# Patient Record
Sex: FEMALE | Race: OTHER | Employment: OTHER | URBAN - METROPOLITAN AREA
[De-identification: names, ages, dates, MRNs, and addresses within clinical notes are randomized per-mention and may not be internally consistent; named-entity substitution may affect disease eponyms.]

---

## 2017-04-25 ENCOUNTER — HOSPITAL ENCOUNTER (OUTPATIENT)
Dept: MAMMOGRAPHY | Age: 72
Discharge: HOME | End: 2017-04-25
Attending: INTERNAL MEDICINE
Payer: MEDICARE

## 2017-04-25 DIAGNOSIS — M81.0 SENILE OSTEOPOROSIS: ICD-10-CM

## 2017-04-25 PROCEDURE — 77080 DXA BONE DENSITY AXIAL: CPT

## 2018-01-22 ENCOUNTER — OFFICE VISIT (OUTPATIENT)
Dept: NEUROLOGY | Age: 73
End: 2018-01-22

## 2018-01-22 VITALS
OXYGEN SATURATION: 98 % | SYSTOLIC BLOOD PRESSURE: 104 MMHG | HEART RATE: 63 BPM | BODY MASS INDEX: 25.51 KG/M2 | WEIGHT: 135 LBS | RESPIRATION RATE: 18 BRPM | DIASTOLIC BLOOD PRESSURE: 62 MMHG

## 2018-01-22 DIAGNOSIS — R20.9 SKIN SENSATION DISTURBANCE: ICD-10-CM

## 2018-01-22 DIAGNOSIS — R20.2 PARESTHESIA: Primary | ICD-10-CM

## 2018-01-22 RX ORDER — ALENDRONATE SODIUM 70 MG/1
TABLET ORAL
COMMUNITY
End: 2018-03-29

## 2018-01-22 NOTE — MR AVS SNAPSHOT
[facility] [address] [facility] 13 
[phone] Patient: Birgit Almonte MRN: [id number] :1945 Visit Information Date & Time Provider Department Dept. Phone Encounter #  
 2018  3:00 PM Kamila Nelson, [address] [facility] at [address] [id number] Follow-up Instructions Return if symptoms worsen or fail to improve. Upcoming Health Maintenance Date Due Hepatitis C Screening 1945 DTaP/Tdap/Td series (1 - Tdap) 1967 BREAST CANCER SCRN MAMMOGRAM 1996 FOBT Q 1 YEAR AGE 50-75 1996 ZOSTER VACCINE AGE 60> 10/28/2006 GLAUCOMA SCREENING Q2Y 2011 Pneumococcal 65+ Low/Medium Risk (1 of 2 - PCV13) 2011 MEDICARE YEARLY EXAM 2011 Influenza Age 5 to Adult 2017 Allergies as of 2018  Review Complete On: 2018 By: Kamila Nelson DO Severity Noted Reaction Type Reactions Codeine  2016    Other (comments)  
 dizziness Current Immunizations  Never Reviewed No immunizations on file. Not reviewed this visit You Were Diagnosed With   
  
 Codes Comments Paresthesia    -  Primary ICD-10-CM: R20.2 ICD-9-CM: 782.0 Skin sensation disturbance     ICD-10-CM: R20.9 ICD-9-CM: 917. 0 Vitals Resp Weight(growth percentile) SpO2 BMI Smoking Status 18 135 lb (61.2 kg) 98% 25.51 kg/m2 Never Smoker BMI and BSA Data Body Mass Index Body Surface Area 25.51 kg/m 2 1.62 m 2 Preferred Pharmacy Pharmacy Name Phone [id number] [place], [address] [phone] Your Updated Medication List  
  
   
This list is accurate as of: 18  3:42 PM.  Always use your most recent med list.  
  
  
  
  
 cholecalciferol 1,000 unit Cap Commonly known as:  VITAMIN D3  
 Take  by mouth daily. cyanocobalamin 1,000 mcg tablet Take 1,500 mcg by mouth daily. FOSAMAX 70 mg tablet Generic drug:  alendronate Take  by mouth.  
  
 pitavastatin calcium 1 mg Tab tablet Commonly known as:  LIVALO Take  by mouth nightly. Follow-up Instructions Return if symptoms worsen or fail to improve. To-Do List   
 01/22/2018 Imaging:  MRI [facility] SPINE WO CONT Patient Instructions A Healthy Lifestyle: Care Instructions Your Care Instructions A healthy lifestyle can help you feel good, stay at a healthy weight, and have plenty of energy for both work and play. A healthy lifestyle is something you can share with your whole family. A healthy lifestyle also can lower your risk for serious health problems, such as high blood pressure, heart disease, and diabetes. You can follow a few steps listed below to improve your health and the health of your family. Follow-up care is a key part of your treatment and safety. Be sure to make and go to all appointments, and call your doctor if you are having problems. It's also a good idea to know your test results and keep a list of the medicines you take. How can you care for yourself at home? · Do not eat too much sugar, fat, or fast foods. You can still have dessert and treats now and then. The goal is moderation. · Start small to improve your eating habits. Pay attention to portion sizes, drink less juice and soda pop, and eat more fruits and vegetables. ¨ Eat a healthy amount of food. A 3-ounce serving of meat, for example, is about the size of a deck of cards. Fill the rest of your plate with vegetables and whole grains. ¨ Limit the amount of soda and sports drinks you have every day. Drink more water when you are thirsty. ¨ Eat at least 5 servings of fruits and vegetables every day.  It may seem like a lot, but it is not hard to reach this goal. A serving or helping is 1 piece of fruit, 1 cup of vegetables, or 2 cups of leafy, raw vegetables. Have an apple or some carrot sticks as an afternoon snack instead of a candy bar. Try to have fruits and/or vegetables at every meal. 
· Make exercise part of your daily routine. You may want to start with simple activities, such as walking, bicycling, or slow swimming. Try to be active 30 to 60 minutes every day. You do not need to do all 30 to 60 minutes all at once. For example, you can exercise 3 times a day for 10 or 20 minutes. Moderate exercise is safe for most people, but it is always a good idea to talk to your doctor before starting an exercise program. 
· Keep moving. Salo Broom the lawn, work in the garden, or [employer]. Take the stairs instead of the elevator at work. · If you smoke, quit. People who smoke have an increased risk for heart attack, stroke, cancer, and other lung illnesses. Quitting is hard, but there are ways to boost your chance of quitting tobacco for good. ¨ Use nicotine gum, patches, or lozenges. ¨ Ask your doctor about stop-smoking programs and medicines. ¨ Keep trying. In addition to reducing your risk of diseases in the future, you will notice some benefits soon after you stop using tobacco. If you have shortness of breath or asthma symptoms, they will likely get better within a few weeks after you quit. · Limit how much alcohol you drink. Moderate amounts of alcohol (up to 2 drinks a day for men, 1 drink a day for women) are okay. But drinking too much can lead to liver problems, high blood pressure, and other health problems. Family health If you have a family, there are many things you can do together to improve your health. · Eat meals together as a family as often as possible. · Eat healthy foods. This includes fruits, vegetables, lean meats and dairy, and whole grains. · Include your family in your fitness plan.  Most people think of activities such as jogging or tennis as the way to fitness, but there are many ways you and your family can be more active. Anything that makes you breathe hard and gets your heart pumping is exercise. Here are some tips: 
¨ Walk to do errands or to take your child to school or the bus. ¨ Go for a family bike ride after dinner instead of watching TV. Where can you learn more? Go to http://rachael-tiny.info/. Enter N005 in the search box to learn more about \"A Healthy Lifestyle: Care Instructions. \" Current as of: May 12, 2017 Content Version: 11.4 © 7805-3251 Comply365. Care instructions adapted under license by [employer] (which disclaims liability or warranty for this information). If you have questions about a medical condition or this instruction, always ask your healthcare professional. [address] any warranty or liability for your use of this information. Introducing [facility] & HEALTH SERVICES! Moon Quintanilla introduces [employer] patient portal. Now you can access parts of your medical record, email your doctor's office, and request medication refills online. 1. In your internet browser, go to [email or web address] 2. Click on the First Time User? Click Here link in the Sign In box. You will see the New Member Sign Up page. 3. Enter your [employer] Access Code exactly as it appears below. You will not need to use this code after youve completed the sign-up process. If you do not sign up before the expiration date, you must request a new code. · [employer] Access Code: [email or web address] Expires: 4/22/2018  2:31 PM 
 
4. Enter the last four digits of your Social Security Number (xxxx) and Date of Birth (mm/dd/yyyy) as indicated and click Submit. You will be taken to the next sign-up page. 5. Create a [employer] ID.  This will be your [employer] login ID and cannot be changed, so think of one that is secure and easy to remember. 6. Create a rPath password. You can change your password at any time. 7. Enter your Password Reset Question and Answer. This can be used at a later time if you forget your password. 8. Enter your e-mail address. You will receive e-mail notification when new information is available in [address] 9. Click Sign Up. You can now view and download portions of your medical record. 10. Click the Download Summary menu link to download a portable copy of your medical information. If you have questions, please visit the Frequently Asked Questions section of the rPath website. Remember, rPath is NOT to be used for urgent needs. For medical emergencies, dial 911. Now available from your iPhone and Android! Please provide this summary of care documentation to your next provider. Your primary care clinician is listed as Trace Davidson. If you have any questions after today's visit, please call [phone].

## 2018-01-22 NOTE — PROGRESS NOTES
[facility]   NEW PATIENT EVALUATION/CONSULTATION       PATIENT NAME: Humera Fatima    MRN: [id number]    REASON FOR CONSULTATION: Tingling    01/22/18      Previous records (physician notes, laboratory reports, and radiology reports) and imaging studies were reviewed and summarized. My recommendations will be communicated back to the patient's physician(s) via electronic medical record and/or by [place] mail. HISTORY OF PRESENT ILLNESS:  Humera Fatima is a 71 y.o. right handed female presenting for evaluation of back paresthesias. She noted 2 months ago intermittent tingling/pinching around the T4 region, midline. No numbness or sharp radiating pain. No skin rash, vesicles. Denies back pain. Sx remain intermittent, not getting worse since onset. No LE paresthesias. No bowel/bladder incontinence. No h/o DM. Reports recent lab work was normal at her yearly check-up. No recent back trauma. Denies h/o paresthesias. No exacerbating factors. Hot compress improves sx.         PAST MEDICAL HISTORY  Past Medical History:   Diagnosis Date    Family history of cardiac disorder 2/17/2016    Hypercholesteremia 2/17/2016       PAST SURGICAL HISTORY:  Past Surgical History:   Procedure Laterality Date    HX HYSTERECTOMY  2/26/2014       FAMILY HISTORY:   Family History   Problem Relation Age of Onset    Heart Disease Mother     Heart Disease Father     Pacemaker Sister     Pacemaker Brother     Heart Disease Brother          SOCIAL HISTORY:  Social History     Social History    Marital status:      Spouse name: N/A    Number of children: N/A    Years of education: N/A     Social History Main Topics    Smoking status: Never Smoker    Smokeless tobacco: Never Used    Alcohol use No    Drug use: None    Sexual activity: Not Asked     Other Topics Concern    None     Social History Narrative         MEDICATIONS:   Current Outpatient Prescriptions   Medication Sig Dispense Refill    alendronate (FOSAMAX) 70 mg tablet Take  by mouth.  pitavastatin (LIVALO) 1 mg tab tablet Take  by mouth nightly.  cholecalciferol (VITAMIN D3) 1,000 unit cap Take  by mouth daily.  cyanocobalamin 1,000 mcg tablet Take 1,500 mcg by mouth daily. ALLERGIES:  Allergies   Allergen Reactions    Codeine Other (comments)     dizziness         REVIEW OF SYSTEMS:  10 point ROS reviewed with patient. Please see scanned document under media. PHYSICAL EXAM:  Vital Signs:   Visit Vitals    /62    Pulse 63    Resp 18    Wt 61.2 kg (135 lb)    SpO2 98%    BMI 25.51 kg/m2        General Medical Exam:  General:  Well appearing, comfortable, in no apparent distress. Eyes/ENT: see cranial nerve examination. Neck: No masses appreciated. Full range of motion without tenderness. Respiratory:  Clear to auscultation, good air entry bilaterally. Cardiac:  Regular rate and rhythm, no murmur. GI:  Soft, non-tender, non-distended abdomen. Bowel sounds normal. No masses, organomegaly. Extremities:  No deformities, edema, or skin discoloration. Skin:  No rashes or lesions. Neurological:  · Mental Status:  Alert and oriented to person, place, and time with fluent speech. · Cranial Nerves:   CNII/III/IV/VI: visual fields full to confrontation, EOMI, PERRL, no ptosis or nystagmus. CN V: Facial sensation intact bilaterally, masseter 5/5   CN VII: Facial muscles symmetric and strong   CN VIII: Hears finger rub well bilaterally, intact vestibular function   CN IX/X: Normal palatal movement   CN XI: Full strength shoulder shrug bilaterally   CN XII: Tongue protrusion full and midline without fasciculation or atrophy  · Motor: Normal tone and muscle bulk with no pronator drift.    Individual muscle group testing:  Shoulder abduction:   Left:5/5   Right : 5/5    Shoulder adduction:   Left:5/5   Right : 5/5    Elbow flexion:      Left:5/5   Right : 5/5  Elbow extension:    Left:5/5   Right : 5/5 Wrist flexion:    Left:5/5   Right : 5/5  Wrist extension:    Left:5/5   Right : 5/5  Arm pronation:   Left:5/5   Right : 5/5  Arm supination:   Left:5/5   Right : 5/5    Finger flexion:    Left:5/5   Right : 5/5    Finger extension:   Left:5/5   Right : 5/5   Finger abduction:  Left:5/5   Right : 5/5   Finger adduction:   Left:5/5   Right : 5/5  Hip flexion:     Left:5/5   Right : 5/5         Hip extension:   Left:5/5   Right : 5/5    Knee flexion:     Left:5/5   Right : 5/5    Knee extension:   Left:5/5   Right : 5/5    Dorsiflexion:     Left:5/5   Right : 5/5  Plantar flexion:    Left:5/5   Right : 5/5      · MSRs: No crossed adductors or clonus. RIGHT  LEFT   Brachioradialis 1+ 1+   Biceps 2+ 2+   Triceps 1+ 1+   Knee 1+ 1+   Achilles 0 0        Plantar response Downward Downward          · Sensation: Normal and symmetric perception of pinprick, temperature, light touch, proprioception, and vibration; (-) Romberg. · Coordination: No dysmetria. Normal rapid alternating movements; finger-to-nose and heel-to- shin testing are within normal limits. · Gait: Normal native gait      ASSESSMENT:      ICD-10-CM ICD-9-CM    1. Paresthesia R20.2 782.0 alendronate (FOSAMAX) 70 mg tablet      MRI [facility] SPINE WO CONT   2. Skin sensation disturbance R20.9 782.0 alendronate (FOSAMAX) 70 mg tablet      MRI THORAC SPINE WO CONT   71year old female presenting with intermittent, non-progressive midline paresthesias of the thoracic region x 2 months without accompanying rash not exacerbated by activity. Will obtain imaging to exclude possibility of a thoracic radiculopathy or intrinsic cord pathology at the T4-5 level. Discussed options for management of her neuropathic pain sx which she defers presently. PLAN:  · MRI Thoracic WO    I have discussed the diagnosis with the patient and the intended plan as seen in the above orders. Patient is in agreement.  The patient has received an after-visit summary and questions were answered concerning future plans. Follow-up Disposition:  TBD after imaging    Mechelle Anaya.  Adrien Ryder DO  Staff Neurologist  Diplomate, [employer] [place] [employer]

## 2018-02-03 ENCOUNTER — HOSPITAL ENCOUNTER (OUTPATIENT)
Dept: MRI IMAGING | Age: 73
Discharge: HOME | End: 2018-02-03
Attending: PSYCHIATRY & NEUROLOGY
Payer: MEDICARE

## 2018-02-03 DIAGNOSIS — R20.9 SKIN SENSATION DISTURBANCE: ICD-10-CM

## 2018-02-03 DIAGNOSIS — R20.2 PARESTHESIA: ICD-10-CM

## 2018-02-03 PROCEDURE — 72146 MRI CHEST SPINE W/O DYE: CPT

## 2018-02-12 ENCOUNTER — TELEPHONE (OUTPATIENT)
Dept: NEUROLOGY | Age: 73
End: 2018-02-12

## 2018-02-12 NOTE — TELEPHONE ENCOUNTER
----- Message from Marie Muñoz sent at 2/12/2018  9:50 AM EST -----  Regarding: /telephone  Pt is requesting a call back in regards to her test results.  Best contact [phone]

## 2018-02-28 NOTE — TELEPHONE ENCOUNTER
----- Message from [facility] sent at 2/28/2018 10:43 AM EST -----  Regarding: Dr. Haddad Ba telephone  The pt has been waiting a week for a callback about her MRA results. Best contact number is [phone].

## 2018-03-01 NOTE — TELEPHONE ENCOUNTER
Spoke with patient, informed her  suggested PT only if symptoms are getting worse. She says she'd like to have a follow up to discuss her symptoms since they are persisting.

## 2018-03-29 ENCOUNTER — OFFICE VISIT (OUTPATIENT)
Dept: NEUROLOGY | Age: 73
End: 2018-03-29

## 2018-03-29 VITALS
SYSTOLIC BLOOD PRESSURE: 106 MMHG | WEIGHT: 140 LBS | OXYGEN SATURATION: 95 % | DIASTOLIC BLOOD PRESSURE: 68 MMHG | BODY MASS INDEX: 26.45 KG/M2 | HEART RATE: 71 BPM | RESPIRATION RATE: 18 BRPM

## 2018-03-29 DIAGNOSIS — M79.10 MYALGIA: Primary | ICD-10-CM

## 2018-03-29 NOTE — MR AVS SNAPSHOT
[facility] [address] [place] [address] 
[phone] Patient: Rajinder Hackett MRN: [id number] :1945 Visit Information Date & Time Provider Department Dept. Phone Encounter #  
 3/29/2018 10:00 AM Michele Toro, [facility] at [address] [id number] Follow-up Instructions Return in about 4 weeks (around 2018). Upcoming Health Maintenance Date Due Hepatitis C Screening 1945 DTaP/Tdap/Td series (1 - Tdap) 1967 BREAST CANCER SCRN MAMMOGRAM 1996 FOBT Q 1 YEAR AGE 50-75 1996 ZOSTER VACCINE AGE 60> 10/28/2006 GLAUCOMA SCREENING Q2Y 2011 Pneumococcal 65+ Low/Medium Risk (1 of 2 - PCV13) 2011 Influenza Age 5 to Adult 2017 MEDICARE YEARLY EXAM 3/14/2018 Allergies as of 3/29/2018  Review Complete On: 3/29/2018 By: Michele Toro DO Severity Noted Reaction Type Reactions Codeine  2016    Other (comments)  
 dizziness Current Immunizations  Never Reviewed No immunizations on file. Not reviewed this visit You Were Diagnosed With   
  
 Codes Comments Myalgia    -  Primary ICD-10-CM: M79.1 ICD-9-CM: 729.1 Vitals BP Pulse Resp Weight(growth percentile) SpO2 BMI  
 106/68 71 18 140 lb (63.5 kg) 95% 26.45 kg/m2 Smoking Status Never Smoker BMI and BSA Data Body Mass Index Body Surface Area  
 26.45 kg/m 2 1.65 m 2 Preferred Pharmacy Pharmacy Name Phone [id number] [place] [address] [facility] [phone] Your Updated Medication List  
  
   
This list is accurate as of 3/29/18 10:27 AM.  Always use your most recent med list.  
  
  
  
  
 cholecalciferol 1,000 unit Cap Commonly known as:  VITAMIN D3 Take  by mouth daily. cyanocobalamin 1,000 mcg tablet Take 1,500 mcg by mouth daily. pitavastatin calcium 1 mg Tab tablet Commonly known as:  LIVALO Take  by mouth nightly. We Performed the Following ALDOLASE [id number] CPT(R)] CK [id number] CPT(R)] TSH 3RD GENERATION [10626 CPT(R)] Follow-up Instructions Return in about 4 weeks (around 4/26/2018). Patient Instructions A Healthy Lifestyle: Care Instructions Your Care Instructions A healthy lifestyle can help you feel good, stay at a healthy weight, and have plenty of energy for both work and play. A healthy lifestyle is something you can share with your whole family. A healthy lifestyle also can lower your risk for serious health problems, such as high blood pressure, heart disease, and diabetes. You can follow a few steps listed below to improve your health and the health of your family. Follow-up care is a key part of your treatment and safety. Be sure to make and go to all appointments, and call your doctor if you are having problems. It's also a good idea to know your test results and keep a list of the medicines you take. How can you care for yourself at home? · Do not eat too much sugar, fat, or fast foods. You can still have dessert and treats now and then. The goal is moderation. · Start small to improve your eating habits. Pay attention to portion sizes, drink less juice and soda pop, and eat more fruits and vegetables. ¨ Eat a healthy amount of food. A 3-ounce serving of meat, for example, is about the size of a deck of cards. Fill the rest of your plate with vegetables and whole grains. ¨ Limit the amount of soda and sports drinks you have every day. Drink more water when you are thirsty. ¨ Eat at least 5 servings of fruits and vegetables every day. It may seem like a lot, but it is not hard to reach this goal. A serving or helping is 1 piece of fruit, 1 cup of vegetables, or 2 cups of leafy, raw vegetables. Have an apple or some carrot sticks as an afternoon snack instead of a candy bar. Try to have fruits and/or vegetables at every meal. 
· Make exercise part of your daily routine. You may want to start with simple activities, such as walking, bicycling, or slow swimming. Try to be active 30 to 60 minutes every day. You do not need to do all 30 to 60 minutes all at once. For example, you can exercise 3 times a day for 10 or 20 minutes. Moderate exercise is safe for most people, but it is always a good idea to talk to your doctor before starting an exercise program. 
· Keep moving. Jennifer Moulding the lawn, work in the garden, or [employer]. Take the stairs instead of the elevator at work. · If you smoke, quit. People who smoke have an increased risk for heart attack, stroke, cancer, and other lung illnesses. Quitting is hard, but there are ways to boost your chance of quitting tobacco for good. ¨ Use nicotine gum, patches, or lozenges. ¨ Ask your doctor about stop-smoking programs and medicines. ¨ Keep trying. In addition to reducing your risk of diseases in the future, you will notice some benefits soon after you stop using tobacco. If you have shortness of breath or asthma symptoms, they will likely get better within a few weeks after you quit. · Limit how much alcohol you drink. Moderate amounts of alcohol (up to 2 drinks a day for men, 1 drink a day for women) are okay. But drinking too much can lead to liver problems, high blood pressure, and other health problems. Family health If you have a family, there are many things you can do together to improve your health. · Eat meals together as a family as often as possible. · Eat healthy foods. This includes fruits, vegetables, lean meats and dairy, and whole grains. · Include your family in your fitness plan.  Most people think of activities such as jogging or tennis as the way to fitness, but there are many ways you and your family can be more active. Anything that makes you breathe hard and gets your heart pumping is exercise. Here are some tips: 
¨ Walk to do errands or to take your child to school or the bus. ¨ Go for a family bike ride after dinner instead of watching TV. Where can you learn more? Go to http://rachael-tiny.info/. Enter M112 in the search box to learn more about \"A Healthy Lifestyle: Care Instructions. \" Current as of: May 12, 2017 Content Version: 11.4 © 7534-5962 isango!. Care instructions adapted under license by [employer] (which disclaims liability or warranty for this information). If you have questions about a medical condition or this instruction, always ask your healthcare professional. [address] any warranty or liability for your use of this information. CoQ10 supplement Introducing [facility] & HEALTH SERVICES! [facility] introduces AddressReport patient portal. Now you can access parts of your medical record, email your doctor's office, and request medication refills online. 1. In your internet browser, go to [email or web address] 2. Click on the First Time User? Click Here link in the Sign In box. You will see the New Member Sign Up page. 3. Enter your AddressReport Access Code exactly as it appears below. You will not need to use this code after youve completed the sign-up process. If you do not sign up before the expiration date, you must request a new code. · AddressReport Access Code: [email or web address] Expires: 4/22/2018  3:31 PM 
 
4. Enter the last four digits of your Social Security Number (xxxx) and Date of Birth (mm/dd/yyyy) as indicated and click Submit. You will be taken to the next sign-up page. 5. Create a AddressReport ID. This will be your AddressReport login ID and cannot be changed, so think of one that is secure and easy to remember. 6. Create a [email or web address] password. You can change your password at any time. 7. Enter your Password Reset Question and Answer. This can be used at a later time if you forget your password. 8. Enter your e-mail address. You will receive e-mail notification when new information is available in [address] 9. Click Sign Up. You can now view and download portions of your medical record. 10. Click the Download Summary menu link to download a portable copy of your medical information. If you have questions, please visit the Frequently Asked Questions section of the [email or web address] website. Remember, [email or web address] is NOT to be used for urgent needs. For medical emergencies, dial 911. Now available from your iPhone and Android! Please provide this summary of care documentation to your next provider. Your primary care clinician is listed as Beth Rothman. If you have any questions after today's visit, please call [phone].

## 2018-03-29 NOTE — PATIENT INSTRUCTIONS

## 2018-03-29 NOTE — PROGRESS NOTES
Neurology Clinic Follow up Note    Patient ID:  Zaire Valles  [id number]  62 y.o.  1945      Ms. More Mcintosh is here for follow up today of paresthesias       Last Appointment With Me:  1/22/2018       Interval History:   Pt reports some aching pain into the hamstring muscles b/l as well as bone pain after starting a new medication (Fosamax) for osteoporosis. Bone pain has resolved since stopping Fosamax but her muscular pain persists. Reports occasional tingling into the distal LE b/l. Walking tends to improve sx. She reports previous myalgias/muscle aching associated with statin therapy. She is currently taking Livalo. PMHx/ PSHx/ FHx/ SHx:  Reviewed and unchanged previous visit. Past Medical History:   Diagnosis Date    Family history of cardiac disorder 2/17/2016    Hypercholesteremia 2/17/2016         ROS:  Comprehensive review of systems negative except for as noted above. Objective:       Meds:  Current Outpatient Prescriptions   Medication Sig Dispense Refill    pitavastatin (LIVALO) 1 mg tab tablet Take  by mouth nightly.  cholecalciferol (VITAMIN D3) 1,000 unit cap Take  by mouth daily.  cyanocobalamin 1,000 mcg tablet Take 1,500 mcg by mouth daily. Exam:  Visit Vitals    /68    Pulse 71    Resp 18    Wt 63.5 kg (140 lb)    SpO2 95%    BMI 26.45 kg/m2     NEUROLOGICAL EXAM:  General: Awake, alert, speech fluent  CN: PERRL, EOMI without nystagmus, VFF to confrontation, facial sensation and strength are normal and symmetric, hearing is intact to finger rub bilaterally, palate and tongue movements are intact and symmetric. Motor: Normal tone, bulk and strength bilaterally. Reflexes: 1/4 and symmetric, plantar stimulation is flexor. Coordination: FNF, DEEDEE, HTS intact. Sensation: LT/temp/PP/vibration/proprioception intact throughout  Gait: Normal-based and steady. LABS  No results found for this or any previous visit.     IMAGING:  MRI Results (most recent):    Results from [place] encounter on 02/03/18   MRI [facility] SPINE WO CONT   Narrative EXAM:  MRI [facility] SPINE WO CONT  INDICATION:  thoracic paresthesias  TECHNIQUE: Sagittal T1, T2, STIR and axial T1 and T2 weighted images of the  thoracic spine were obtained. COMPARISON: None available. FINDINGS:  Thoracic vertebral alignment is adequately maintained. Small left posterior lateral disc bulge or minimal protrusion T7-8. Minimal  stenosis. No cord compression. No other significant disc bulge. No spinal stenosis. The thoracic spinal cord has normal size, contour and signal.         Impression IMPRESSION:  1. T7-8 small left posterior lateral disc protrusion with minimal/mild stenosis  and no cord compression. 2. Otherwise essentially normal MRI of thoracic spine. Assessment:     Encounter Diagnoses     ICD-10-CM ICD-9-CM   1. Myalgia M79.1 67.1   71year old female previously seen for midline paresthesias of the thoracic region without accompanying rash. Thoracic MRI performed showing small L posterior disc protrusion at T7-8 without cord compression. No progression of thoracic paresthesias since initial visit. She returns due to subacute onset myalgias of the hamstring muscles b/l. No accompanying weakness on examination today and neurological examination is essentially unchanged from prior visit without focal deficits. 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 Discussed a trial off of statin therapy and CoQ10 supplementation x 4 weeks. Will reassess for symptomatic improvement at follow-up. Will also check thyroid function to exclude metabolic/thyroid myopathy. Plan:   CK, Aldolase, TSH  Trial off of statin therapy x 4 weeks  Start CoQ10 supplementation    Follow-up Disposition:  Return in about 4 weeks (around 4/26/2018).       Signed:  Siddhartha Kelly,   3/29/2018

## 2018-03-29 NOTE — LETTER
3/29/2018 10:41 AM 
 
Patient:  Nakul Hernandez YOB: 1945 Date of Visit: 3/29/2018 Dear Everardo Callaway MD 
[address] [address] VIA Facsimile: [phone] 
 : 
 
 
I recently had the pleasure of seeing  Ms. Nakul Hernandez for evaluation/treatment. Below are the relevant portions of my assessment and plan of care. If you have questions, please do not hesitate to call me. I look forward to following Ms. Boothe along with you.  
 
 
 
Sincerely, 
 
 
Kalyn Come, DO

## 2018-03-30 LAB
ALDOLASE SERPL-CCNC: 2.8 U/L (ref 3.3–10.3)
CK SERPL-CCNC: 71 U/L (ref 24–173)
TSH SERPL DL<=0.005 MIU/L-ACNC: 3.47 UIU/ML (ref 0.45–4.5)

## 2018-05-07 ENCOUNTER — OFFICE VISIT (OUTPATIENT)
Dept: NEUROLOGY | Age: 73
End: 2018-05-07

## 2018-05-07 VITALS
OXYGEN SATURATION: 98 % | SYSTOLIC BLOOD PRESSURE: 120 MMHG | HEART RATE: 74 BPM | RESPIRATION RATE: 18 BRPM | DIASTOLIC BLOOD PRESSURE: 78 MMHG

## 2018-05-07 DIAGNOSIS — R20.2 PARESTHESIA OF BOTH FEET: Primary | ICD-10-CM

## 2018-05-07 DIAGNOSIS — M79.10 MYALGIA: ICD-10-CM

## 2018-05-07 NOTE — PATIENT INSTRUCTIONS

## 2018-05-07 NOTE — MR AVS SNAPSHOT
[address] [place] [address] [address] [place] [address] 
[phone] Patient: Tika Redding MRN: [id number] :1945 Visit Information Date & Time Provider Department Dept. Phone Encounter #  
 2018  9:30 AM DO Carlie Schneider [facility] at [address] [id number] Follow-up Instructions Return if symptoms worsen or fail to improve. Upcoming Health Maintenance Date Due Hepatitis C Screening 1945 DTaP/Tdap/Td series (1 - Tdap) 1967 BREAST CANCER SCRN MAMMOGRAM 1996 FOBT Q 1 YEAR AGE 50-75 1996 ZOSTER VACCINE AGE 60> 10/28/2006 GLAUCOMA SCREENING Q2Y 2011 Pneumococcal 65+ Low/Medium Risk (1 of 2 - PCV13) 2011 MEDICARE YEARLY EXAM 3/14/2018 Influenza Age 5 to Adult 2018 Allergies as of 2018  Review Complete On: 2018 By: Delroy Rodriguez DO Severity Noted Reaction Type Reactions Codeine  2016    Other (comments)  
 dizziness Current Immunizations  Never Reviewed No immunizations on file. Not reviewed this visit You Were Diagnosed With   
  
 Codes Comments Paresthesia of both feet    -  Primary ICD-10-CM: R20.2 ICD-9-CM: 782.0 Myalgia     ICD-10-CM: M79.1 ICD-9-CM: 729.1 Vitals BP Pulse Resp SpO2 Smoking Status 120/78 74 18 98% Never Smoker Preferred Pharmacy Pharmacy Name Phone [facility] #[id number] [phone] Your Updated Medication List  
  
   
This list is accurate as of 18  9:50 AM.  Always use your most recent med list.  
  
  
  
  
 cholecalciferol 1,000 unit Cap Commonly known as:  VITAMIN D3 Take  by mouth daily. COQ10 (UBIQUINOL) PO Take  by mouth.  
  
 cyanocobalamin 1,000 mcg tablet Take 1,500 mcg by mouth daily. We Performed the Following HEMOGLOBIN A1C WITH EAG [93640 CPT(R)] IMMUNOELECTROPHORESIS [facility].) [id number] CPT(R)] METHYLMALONIC ACID [22135 CPT(R)] Follow-up Instructions Return if symptoms worsen or fail to improve. Patient Instructions A Healthy Lifestyle: Care Instructions Your Care Instructions A healthy lifestyle can help you feel good, stay at a healthy weight, and have plenty of energy for both work and play. A healthy lifestyle is something you can share with your whole family. A healthy lifestyle also can lower your risk for serious health problems, such as high blood pressure, heart disease, and diabetes. You can follow a few steps listed below to improve your health and the health of your family. Follow-up care is a key part of your treatment and safety. Be sure to make and go to all appointments, and call your doctor if you are having problems. It's also a good idea to know your test results and keep a list of the medicines you take. How can you care for yourself at home? · Do not eat too much sugar, fat, or fast foods. You can still have dessert and treats now and then. The goal is moderation. · Start small to improve your eating habits. Pay attention to portion sizes, drink less juice and soda pop, and eat more fruits and vegetables. ¨ Eat a healthy amount of food. A 3-ounce serving of meat, for example, is about the size of a deck of cards. Fill the rest of your plate with vegetables and whole grains. ¨ Limit the amount of soda and sports drinks you have every day. Drink more water when you are thirsty. ¨ Eat at least 5 servings of fruits and vegetables every day. It may seem like a lot, but it is not hard to reach this goal. A serving or helping is 1 piece of fruit, 1 cup of vegetables, or 2 cups of leafy, raw vegetables.  Have an apple or some carrot sticks as an afternoon snack instead of a candy bar. Try to have fruits and/or vegetables at every meal. 
· Make exercise part of your daily routine. You may want to start with simple activities, such as walking, bicycling, or slow swimming. Try to be active 30 to 60 minutes every day. You do not need to do all 30 to 60 minutes all at once. For example, you can exercise 3 times a day for 10 or 20 minutes. Moderate exercise is safe for most people, but it is always a good idea to talk to your doctor before starting an exercise program. 
· Keep moving. Ludmila Pour the lawn, work in the garden, or [place]. Take the stairs instead of the elevator at work. · If you smoke, quit. People who smoke have an increased risk for heart attack, stroke, cancer, and other lung illnesses. Quitting is hard, but there are ways to boost your chance of quitting tobacco for good. ¨ Use nicotine gum, patches, or lozenges. ¨ Ask your doctor about stop-smoking programs and medicines. ¨ Keep trying. In addition to reducing your risk of diseases in the future, you will notice some benefits soon after you stop using tobacco. If you have shortness of breath or asthma symptoms, they will likely get better within a few weeks after you quit. · Limit how much alcohol you drink. Moderate amounts of alcohol (up to 2 drinks a day for men, 1 drink a day for women) are okay. But drinking too much can lead to liver problems, high blood pressure, and other health problems. Family health If you have a family, there are many things you can do together to improve your health. · Eat meals together as a family as often as possible. · Eat healthy foods. This includes fruits, vegetables, lean meats and dairy, and whole grains. · Include your family in your fitness plan. Most people think of activities such as jogging or tennis as the way to fitness, but there are many ways you and your family can be more active.  Anything that makes you breathe hard and gets your heart pumping is exercise. Here are some tips: 
¨ Walk to do errands or to take your child to school or the bus. ¨ Go for a family bike ride after dinner instead of watching TV. Where can you learn more? Go to http://rachael-tiny.info/. Enter I236 in the search box to learn more about \"A Healthy Lifestyle: Care Instructions. \" Current as of: May 12, 2017 Content Version: 11.4 © 4507-6802 [employer]. Care instructions adapted under license by [employer] (which disclaims liability or warranty for this information). If you have questions about a medical condition or this instruction, always ask your healthcare professional. [address] any warranty or liability for your use of this information. Introducing [facility]! Wanda Butler introduces Floop patient portal. Now you can access parts of your medical record, email your doctor's office, and request medication refills online. 1. In your internet browser, go to [email or web address] 2. Click on the First Time User? Click Here link in the Sign In box. You will see the New Member Sign Up page. 3. Enter your Floop Access Code exactly as it appears below. You will not need to use this code after youve completed the sign-up process. If you do not sign up before the expiration date, you must request a new code. · Floop Access Code: [email or web address] Expires: 8/5/2018  9:22 AM 
 
4. Enter the last four digits of your Social Security Number (xxxx) and Date of Birth (mm/dd/yyyy) as indicated and click Submit. You will be taken to the next sign-up page. 5. Create a Floop ID. This will be your Floop login ID and cannot be changed, so think of one that is secure and easy to remember. 6. Create a Floop password. You can change your password at any time. 7. Enter your Password Reset Question and Answer.  This can be used at a later time if you forget your password. 8. Enter your e-mail address. You will receive e-mail notification when new information is available in [address] 9. Click Sign Up. You can now view and download portions of your medical record. 10. Click the Download Summary menu link to download a portable copy of your medical information. If you have questions, please visit the Frequently Asked Questions section of the [employer] website. Remember, Optasite is NOT to be used for urgent needs. For medical emergencies, dial 911. Now available from your iPhone and Android! Please provide this summary of care documentation to your next provider. Your primary care clinician is listed as Bharti Perez. If you have any questions after today's visit, please call [phone].

## 2018-05-07 NOTE — PROGRESS NOTES
Neurology Clinic Follow up Note    Patient ID:  Laquita Rich  [id number]  29 y.o.  1945      Ms. Ann Marie Denton is here for follow up today of paresthesias       Last Appointment With Me:  1/22/2018       Interval History:   Pt reports pain described as an ache over the hamstrings b/l when arising from low chairs/sofas. Pain lasts 2 minutes, improves with ambulation. She tried stopping statin x 1 month and no significant change. She reports rare tingling in the AMS into the toes in the evenings, not extending above the ankles. Sx are not severe and non-debilitating, improved with activity. PMHx/ PSHx/ FHx/ SHx:  Reviewed and unchanged previous visit. Past Medical History:   Diagnosis Date    Family history of cardiac disorder 2/17/2016    Hypercholesteremia 2/17/2016         ROS:  Comprehensive review of systems negative except for as noted above. Objective:       Meds:  Current Outpatient Prescriptions   Medication Sig Dispense Refill    COQ10, UBIQUINOL, PO Take  by mouth.  cholecalciferol (VITAMIN D3) 1,000 unit cap Take  by mouth daily.  cyanocobalamin 1,000 mcg tablet Take 1,500 mcg by mouth daily. Exam:  Visit Vitals    /78    Pulse 74    Resp 18    SpO2 98%     NEUROLOGICAL EXAM:  General: Awake, alert, speech fluent  CN: PERRL, EOMI without nystagmus, VFF to confrontation, facial sensation and strength are normal and symmetric, hearing is intact to finger rub bilaterally, palate and tongue movements are intact and symmetric. Motor: Normal tone, bulk and strength bilaterally. Reflexes: 1/4 and symmetric, plantar stimulation is flexor. Coordination: FNF, DEEDEE, HTS intact. Sensation: LT/temp/PP/vibration/proprioception intact throughout  Gait: Normal-based and steady.     LABS  Results for orders placed or performed in visit on 03/29/18   CK   Result Value Ref Range    Creatine Kinase,Total 71 24 - 173 U/L   ALDOLASE   Result Value Ref Range    Aldolase 2.8 (L) 3.3 - 10.3 U/L   TSH 3RD GENERATION   Result Value Ref Range    TSH 3.470 0.450 - 4.500 uIU/mL       IMAGING:  MRI Results (most recent):    Results from [place] encounter on 02/03/18   MRI [facility] SPINE WO CONT   Narrative EXAM:  MRI [facility] SPINE WO CONT  INDICATION:  thoracic paresthesias  TECHNIQUE: Sagittal T1, T2, STIR and axial T1 and T2 weighted images of the  thoracic spine were obtained. COMPARISON: None available. FINDINGS:  Thoracic vertebral alignment is adequately maintained. Small left posterior lateral disc bulge or minimal protrusion T7-8. Minimal  stenosis. No cord compression. No other significant disc bulge. No spinal stenosis. The thoracic spinal cord has normal size, contour and signal.         Impression IMPRESSION:  1. T7-8 small left posterior lateral disc protrusion with minimal/mild stenosis  and no cord compression. 2. Otherwise essentially normal MRI of thoracic spine. Assessment:     Encounter Diagnoses     ICD-10-CM ICD-9-CM   1. Paresthesia of both feet R20.2 782.0   2. Myalgia M79.1 67.1      71year old female previously seen for midline paresthesias of the thoracic region without accompanying rash. Thoracic MRI performed showing small L posterior disc protrusion at T7-8 without cord compression. No progression of thoracic paresthesias since initial visit. She returns for f/u of myalgias of the hamstring muscles b/l when standing from a low seated position. No accompanying weakness on examination or other focal deficits. No significant change in myalgias off of statin therapy. Discussed resuming statin for HPL. CK/aldolase and thyroid all normal.    Encouraged stretching of the hamstrings and resuming daily exercise. Additional labs to be performed today due to reports of mild, intermittent distal LE paresthesias without accompanying large/small fiber sensory deficits on examination.   Defer electrodiagnostic testing at present as this will likely be of low yield. This may be revisited with any new/worsening symptoms. Plan:   HbA1C, MMA, LORENA  Resume statin therapy  Resume stretching and regular exercise    Follow-up Disposition:  Return if symptoms worsen or fail to improve. Signed:  Natalie Madison DO  5/7/2018    The duration of this appointment visit was 25 minutes of face-to-face time with the patient. At least 50% of this time was spent in counseling, explanation of diagnosis, planning of further management, and coordination of care.

## 2018-05-14 LAB
EST. AVERAGE GLUCOSE BLD GHB EST-MCNC: 126 MG/DL
HBA1C MFR BLD: 6 % (ref 4.8–5.6)
IGA SERPL-MCNC: 255 MG/DL (ref 64–422)
IGG SERPL-MCNC: 1299 MG/DL (ref 700–1600)
IGM SERPL-MCNC: 53 MG/DL (ref 26–217)
METHYLMALONATE SERPL-SCNC: 159 NMOL/L (ref 0–378)
PROT PATTERN SERPL IFE-IMP: NORMAL

## 2018-05-24 NOTE — PROGRESS NOTES
Patient advised of lab results. Evidence of pre-DM. She plans to address this via dietary changes with strict glucose control. All questions answered.  NAHUN

## 2018-09-12 ENCOUNTER — OFFICE VISIT (OUTPATIENT)
Dept: RHEUMATOLOGY | Age: 73
End: 2018-09-12

## 2018-09-12 VITALS
RESPIRATION RATE: 16 BRPM | DIASTOLIC BLOOD PRESSURE: 81 MMHG | OXYGEN SATURATION: 94 % | WEIGHT: 131 LBS | SYSTOLIC BLOOD PRESSURE: 122 MMHG | HEIGHT: 61 IN | HEART RATE: 78 BPM | TEMPERATURE: 98.1 F | BODY MASS INDEX: 24.73 KG/M2

## 2018-09-12 DIAGNOSIS — M81.0 OSTEOPOROSIS WITHOUT CURRENT PATHOLOGICAL FRACTURE, UNSPECIFIED OSTEOPOROSIS TYPE: Primary | ICD-10-CM

## 2018-09-12 RX ORDER — CHOLECALCIFEROL TAB 125 MCG (5000 UNIT) 125 MCG
TAB ORAL DAILY
COMMUNITY
End: 2018-09-12 | Stop reason: CLARIF

## 2018-09-12 RX ORDER — ASPIRIN 325 MG
TABLET, DELAYED RELEASE (ENTERIC COATED) ORAL
COMMUNITY

## 2018-09-12 NOTE — PROGRESS NOTES
CHIEF COMPLAINT The patient was sent for rheumatology consultation for evaluation of osteoporosis. HISTORY OF PRESENT ILLNESS This is a 71 y.o.  female. Today, the patient complains of no pain in the joints. Location: none Severity:  0 on a scale of 0-10 Timing: none at this time Duration:  8 years Modifying factors:  
Context/Associated signs and symptoms: Patient was diagnosed with osteoporosis in 2011. She took Fosamax from 1448-6470. Her bone density did not improve with Fosamax, and she switched to Prolia injections from 1830-6880. She stopped Prolia due to AEs of rash. She switched back to Fosamax, and experienced AEs of musculoskeletal pain. She then switched to Risedronate, but stopped this medication due to AEs. She denies a history of steroid use or autoimmune disease. RHEUMATOLOGY REVIEW OF SYSTEMS Positives as per HPI Negatives as follows: 
CONSTITUTlONAL:  Denies unexplained persistent fevers, weight change, chronic fatigue HEAD/EYES:   Denies eye redness, blurry vision or sudden loss of vision, dry eyes, HA, temporal artery pain ENT:    Denies oral/nasal ulcers, recurrent sinus infections, dry mouth RESPIRATORY:  No pleuritic pain, history of pleural effusions, hemoptysis, exertional dyspnea CARDIOVASCULAR:  Denies chest pain, history of pericardial effusions GASTRO:   Denies heartburn, esophageal dysmotility, abdominal pain, nausea, vomiting, diarrhea, blood in the stool HEMATOLOGIC:  No easy bruising, purpura, swollen lymph nodes SKIN:    Denies alopecia, ulcers, nodules, sun sensitivity, unexplained persistent rash VASCULAR:   Denies edema, cyanosis, raynaud phenomenon NEUROLOGIC:  Denies specific muscle weakness, paresthesias PSYCHIATRIC:  No sleep disturbance / snoring, depression, anxiety MSK:    No morning stiffness >1 hour, SI joint pain, persistent joint swelling, persistent joint pain MEDICAL AND SOCIAL HISTORY This was reviewed with the patient and reviewed in the medical records. Past Medical History:  
Diagnosis Date  Family history of cardiac disorder 2/17/2016  Hypercholesteremia 2/17/2016 Past Surgical History:  
Procedure Laterality Date  HX HYSTERECTOMY  2/26/2014 Social History Substance Use Topics  Smoking status: Never Smoker  Smokeless tobacco: Never Used  Alcohol use No  
 
Employment - Retired Sleep - Good, no issues Exercise - no FAMILY HISTORY 
lupus MEDICATIONS All the current medications were reviewed in detail. PHYSICAL EXAM 
Blood pressure 122/81, pulse 78, temperature 98.1 °F (36.7 °C), temperature source Oral, resp. rate 16, height 5' 1\" (1.549 m), weight 131 lb (59.4 kg), SpO2 94 %. GENERAL APPEARANCE: Well-nourished adult in no acute distress. EYES: No scleral erythema, conjunctival injection. ENT: No oral ulcer, parotid enlargement. NECK: No adenopathy, thyroid enlargement. CARDIOVASCULAR: Heart rhythm is regular. No murmur, rub, gallop. CHEST: Normal vesicular breath sounds. No wheezes, rales, pleural friction rubs. ABDOMINAL: The abdomen is soft and nontender. Liver and spleen are nonpalpable. Bowel sounds are normal. 
EXTREMITIES: There is no evidence of clubbing, cyanosis, edema. SKIN: No rash, palpable purpura, digital ulcer, abnormal thickening. NEUROLOGICAL: Normal gait and station, full strength in upper and lower extremities, normal sensation to light touch. MUSCULOSKELETAL: OA changes noted, no synovitis Upper extremities - full range of motion, no tenderness, no swelling, no synovial thickening and no deformity of joints. Lower extremities - full range of motion, no tenderness, no swelling, no synovial thickening and no deformity of joints. LABS, RADIOLOGY AND PROCEDURES Previous labs reviewed -Yes Previous radiology reviewed -Yes 4/2017 DEXA BONE DENSITY STUDY AXIAL Impression: This patient is osteoporotic using the World Health Organization criteria As compared to the prior study, there has been a significant 4.5% decrease in 
the lumbar spine but no change in the hip. Please assess treatment compliance. 3/2015 DEXA BONE DENSITY STUDY AXIAL IMPRESSION: 
This patient is osteoporotic using the World Health Organization criteria As compared to the prior study, there has been no significant change 10 year probability of major osteoporotic fracture:  7.8% 10 year probability of hip fracture:  1.7% 5/2013 DEXA BONE DENSITY STUDY AXIAL IMPRESSION: 
This patient is osteopenic using the World Health Organization criteria As compared to the prior study, there has been 5.5% increase in the lumbar  
spine and 0.1% increase in the total mean hip, compatible with response to  
treatment 10 year probability of major osteoporotic fracture:  13.9% 10 year probability of hip fracture:  2.3% 2/2011 [facility] DXA BONE DENSITY STUDY AXIAL IMPRESSION: 
This patient is osteoporotic using the World Health Organization criteria 10 year probability of major osteoporotic fracture:  7.3% 10 year probability of hip fracture:  1.5% Previous procedures reviewed -Yes Previous medical records reviewed/summarized -Yes ASSESSMENT 1. Osteoporosis - (New problem - Progressive disease) - The patient fulfills the [address] [place] [place] [address] guidelines for pharmacologic intervention in postmenopausal women and men ? 49years of age History of hip or vertebral fracture: no Other prior fractures and T-score between -1.0 and -2.5 at the femoral neck, total hip, or spine, as measured by DEXA : no  
T-score ?-2.5 at the femoral neck, total hip, or spine, after appropriate evaluation to exclude secondary causes: yes T-score between -1.0 and -2.5 at the femoral neck, total hip, or spine and secondary causes associated with high risk of fracture, such as glucocorticoid use or total immobilization: no 
T-score between -1 and -2.5 at the femoral neck, total hip, or spine, and a 10-year probability of hip fracture ? 3% or a 10-year probability of any major osteoporosis-related fracture ? 20 % based upon the [place]-adapted WHO algorithm: no We discussed a routine exercise regimen to prevent bone loss. Intake of up to 800 IU of vitamin D and 1500 mg of calcium should be a goal weather it is through diet or supplements. Obtaining this through diet is generally better as it is absorbed better and calcium supplementation can be constipating. Patient failed Bisphosphonate's - fosamax and risedronate. We will restart her on Prolia injections q6 months. I will obtain her most recent labs from her PCP. We recommend monitoring with a repeat bone density in 2 years. PLAN 1. Start Prolia 2. Calcium, Vitamin D 
3. Obtain labs from Anderson carey (PCP) Bony Mejía MD 
Adult and Pediatric Rheumatology [address], Phone [phone], Fax [phone] E-mail: Camelia Palomino  of Pediatrics Department of Pediatrics, [facility] of [address] [place] [address], [address] [place] [address], Phone [phone], Fax [phone] E-mail: [email or web address] There are no Patient Instructions on file for this visit. cc: 
Raymundo Delaney MD 
 
Written by viraj Lance, as dictated by Trish Kearns.  Sisi Mejía M.D.

## 2018-09-12 NOTE — MR AVS SNAPSHOT
[address] [place] [id number] [phone] Patient: Jo-Ann Holloway MRN: [id number] :1945 Visit Information Date & Time Provider Department Dept. Phone Encounter #  
 2018  3:30 PM MD Matthew Cooper [phone] [id number] Follow-up Instructions Return in about 6 months (around 3/12/2019). Your Appointments 2018 11:00 AM  
New Patient with MD Matthew Cooper [address]) Appt Note: NP self referred pt having alot of pain. 05.15.18 ALG; . ... [place] [address]  
[address] [place] [phone] Upcoming Health Maintenance Date Due Hepatitis C Screening 1945 DTaP/Tdap/Td series (1 - Tdap) 1967 BREAST CANCER SCRN MAMMOGRAM 1996 FOBT Q 1 YEAR AGE 50-75 1996 ZOSTER VACCINE AGE 60> 10/28/2006 GLAUCOMA SCREENING Q2Y 2011 Pneumococcal 65+ Low/Medium Risk (1 of 2 - PCV13) 2011 MEDICARE YEARLY EXAM 3/14/2018 Influenza Age 5 to Adult 2018 Allergies as of 2018  Review Complete On: 2018 By: Yolanda Romero LPN Severity Noted Reaction Type Reactions Codeine  2016    Other (comments)  
 dizziness Current Immunizations  Never Reviewed No immunizations on file. Not reviewed this visit Vitals BP Pulse Temp Resp Height(growth percentile) Weight(growth percentile) 122/81 (BP 1 Location: Left arm, BP Patient Position: Sitting) 78 98.1 °F (36.7 °C) (Oral) 16 5' 1\" (1.549 m) 131 lb (59.4 kg) SpO2 BMI Smoking Status 94% 24.75 kg/m2 Never Smoker Vitals History BMI and BSA Data Body Mass Index Body Surface Area 24.75 kg/m 2 1.6 m 2 Preferred Pharmacy Pharmacy Name Phone [address], [facility] [address] Your Updated Medication List  
  
   
This list is accurate as of 9/12/18  4:33 PM.  Always use your most recent med list.  
  
  
  
  
 * cholecalciferol 1,000 unit Cap Commonly known as:  VITAMIN D3 Take  by mouth daily. * cholecalciferol 50,000 unit capsule Commonly known as:  VITAMIN D3 Take  by mouth every seven (7) days. COQ10 (UBIQUINOL) PO Take  by mouth.  
  
 cyanocobalamin 1,000 mcg tablet Take 1,500 mcg by mouth daily. LIVALO 1 mg Tab tablet Generic drug:  pitavastatin calcium Take  by mouth daily. * Notice: This list has 2 medication(s) that are the same as other medications prescribed for you. Read the directions carefully, and ask your doctor or other care provider to review them with you. Follow-up Instructions Return in about 6 months (around 3/12/2019). Introducing [facility]! [employer] introduces [employer] patient portal. Now you can access parts of your medical record, email your doctor's office, and request medication refills online. 1. In your internet browser, go to [email or web address] 2. Click on the First Time User? Click Here link in the Sign In box. You will see the New Member Sign Up page. 3. Enter your Cellay Access Code exactly as it appears below. You will not need to use this code after youve completed the sign-up process. If you do not sign up before the expiration date, you must request a new code. · Cellay Access Code: [email or web address] Expires: 12/11/2018  3:37 PM 
 
4. Enter the last four digits of your Social Security Number (xxxx) and Date of Birth (mm/dd/yyyy) as indicated and click Submit. You will be taken to the next sign-up page. 5. Create a Cellay ID.  This will be your Cellay login ID and cannot be changed, so think of one that is secure and easy to remember. 6. Create a [facility] password. You can change your password at any time. 7. Enter your Password Reset Question and Answer. This can be used at a later time if you forget your password. 8. Enter your e-mail address. You will receive e-mail notification when new information is available in [address] 9. Click Sign Up. You can now view and download portions of your medical record. 10. Click the Download Summary menu link to download a portable copy of your medical information. If you have questions, please visit the Frequently Asked Questions section of the [facility] website. Remember, [facility] is NOT to be used for urgent needs. For medical emergencies, dial 911. Now available from your iPhone and Android! Please provide this summary of care documentation to your next provider. Your primary care clinician is listed as Carter Habermann. If you have any questions after today's visit, please call [phone].

## 2018-09-20 ENCOUNTER — APPOINTMENT (OUTPATIENT)
Dept: INFUSION THERAPY | Age: 73
End: 2018-09-20

## 2018-10-19 ENCOUNTER — HOSPITAL ENCOUNTER (OUTPATIENT)
Dept: INFUSION THERAPY | Age: 73
Discharge: HOME | End: 2018-10-19
Payer: MEDICARE

## 2018-10-26 ENCOUNTER — HOSPITAL ENCOUNTER (OUTPATIENT)
Dept: INFUSION THERAPY | Age: 73
Discharge: HOME | End: 2018-10-26
Payer: MEDICARE

## 2018-10-26 VITALS
DIASTOLIC BLOOD PRESSURE: 71 MMHG | HEART RATE: 80 BPM | SYSTOLIC BLOOD PRESSURE: 112 MMHG | WEIGHT: 134.2 LBS | BODY MASS INDEX: 25.36 KG/M2 | RESPIRATION RATE: 18 BRPM | TEMPERATURE: 97.7 F

## 2018-10-26 LAB
ALBUMIN SERPL-MCNC: 3.3 G/DL (ref 3.5–5)
ANION GAP SERPL CALC-SCNC: 5 MMOL/L (ref 5–15)
BUN SERPL-MCNC: 15 MG/DL (ref 6–20)
BUN/CREAT SERPL: 18 (ref 12–20)
CALCIUM SERPL-MCNC: 8.1 MG/DL (ref 8.5–10.1)
CHLORIDE SERPL-SCNC: 107 MMOL/L (ref 97–108)
CO2 SERPL-SCNC: 27 MMOL/L (ref 21–32)
CREAT SERPL-MCNC: 0.83 MG/DL (ref 0.55–1.02)
GLUCOSE SERPL-MCNC: 88 MG/DL (ref 65–100)
MAGNESIUM SERPL-MCNC: 2.2 MG/DL (ref 1.6–2.4)
PHOSPHATE SERPL-MCNC: 3.2 MG/DL (ref 2.6–4.7)
POTASSIUM SERPL-SCNC: 3.6 MMOL/L (ref 3.5–5.1)
SODIUM SERPL-SCNC: 139 MMOL/L (ref 136–145)

## 2018-10-26 PROCEDURE — 83735 ASSAY OF MAGNESIUM: CPT | Performed by: PEDIATRICS

## 2018-10-26 PROCEDURE — 96372 THER/PROPH/DIAG INJ SC/IM: CPT

## 2018-10-26 PROCEDURE — 74011250636 HC RX REV CODE- 250/636: Performed by: PEDIATRICS

## 2018-10-26 PROCEDURE — 80069 RENAL FUNCTION PANEL: CPT | Performed by: PEDIATRICS

## 2018-10-26 PROCEDURE — 36415 COLL VENOUS BLD VENIPUNCTURE: CPT | Performed by: PEDIATRICS

## 2018-10-26 RX ADMIN — DENOSUMAB 60 MG: 60 INJECTION SUBCUTANEOUS at 15:19

## 2018-10-26 NOTE — PROGRESS NOTES
1410 Pt admit to [facility] for Q 6 month Prolia ambulatory in stable condition. Assessment completed, education provided regarding medication, side effects, need to continue taking calcium/vitamin D. No new concerns voiced. Labs drawn peripherally and sent. Visit Vitals /71 (BP 1 Location: Right arm, BP Patient Position: Sitting) Pulse 80 Temp 97.7 °F (36.5 °C) Resp 18 Wt 60.9 kg (134 lb 3.2 oz) BMI 25.36 kg/m² Medications: 
Prolia SQ in upper left arm 
 
1530 Pt tolerated treatment well. D/c home ambulatory in no distress. Pt aware of next [facility] appointment scheduled for 4/19/19. Recent Results (from the past 12 hour(s)) RENAL FUNCTION PANEL Collection Time: 10/26/18  2:14 PM  
Result Value Ref Range Sodium 139 136 - 145 mmol/L Potassium 3.6 3.5 - 5.1 mmol/L Chloride 107 97 - 108 mmol/L  
 CO2 27 21 - 32 mmol/L Anion gap 5 5 - 15 mmol/L Glucose 88 65 - 100 mg/dL BUN 15 6 - 20 MG/DL Creatinine 0.83 0.55 - 1.02 MG/DL  
 BUN/Creatinine ratio 18 12 - 20 GFR est AA >60 >60 ml/min/1.73m2 GFR est non-AA >60 >60 ml/min/1.73m2 Calcium 8.1 (L) 8.5 - 10.1 MG/DL Phosphorus 3.2 2.6 - 4.7 MG/DL Albumin 3.3 (L) 3.5 - 5.0 g/dL MAGNESIUM Collection Time: 10/26/18  2:14 PM  
Result Value Ref Range Magnesium 2.2 1.6 - 2.4 mg/dL

## 2019-04-19 ENCOUNTER — APPOINTMENT (OUTPATIENT)
Dept: INFUSION THERAPY | Age: 74
End: 2019-04-19

## 2019-04-22 ENCOUNTER — OFFICE VISIT (OUTPATIENT)
Dept: RHEUMATOLOGY | Age: 74
End: 2019-04-22

## 2019-04-22 VITALS
BODY MASS INDEX: 24 KG/M2 | OXYGEN SATURATION: 97 % | HEART RATE: 85 BPM | SYSTOLIC BLOOD PRESSURE: 134 MMHG | TEMPERATURE: 98 F | WEIGHT: 127 LBS | DIASTOLIC BLOOD PRESSURE: 81 MMHG | RESPIRATION RATE: 16 BRPM

## 2019-04-22 DIAGNOSIS — M81.0 OSTEOPOROSIS WITHOUT CURRENT PATHOLOGICAL FRACTURE, UNSPECIFIED OSTEOPOROSIS TYPE: Primary | ICD-10-CM

## 2019-04-22 RX ORDER — CALCIUM CARBONATE 500(1250)
TABLET ORAL DAILY
COMMUNITY

## 2019-04-22 NOTE — PROGRESS NOTES
Chief Complaint Patient presents with  Joint Pain 1. Have you been to the ER, urgent care clinic since your last visit? Hospitalized since your last visit? No 
 
2. Have you seen or consulted any other health care providers outside of the [address] [place] [address] since your last visit? Include any pap smears or colon screening.  No

## 2019-04-22 NOTE — PROGRESS NOTES
RHEUMATOLOGY PROBLEM LIST AND CHIEF COMPLAINT 1. Osteoporosis - She took Fosamax from 3681-7500. Her bone density did not improve with Fosamax, and she switched to Prolia injections from 5149-8250. She stopped Prolia due to AEs of rash. She switched back to Fosamax, and experienced AEs of musculoskeletal pain. She then switched to Risedronate, but stopped this medication due to AEs. Current medication - prolia INTERVAL HISTORY This is a 68 y.o.  female. Today, the patient complains of no pain in the joints. Location: none Severity:  0 on a scale of 0-10 Timing: none at this time Duration:  7 months Modifying factors:  
Context/Associated signs and symptoms: The patient has started Prolia with no issues apart from muscle pain for 1-2 days post-injection. She complains of intermittent thumb pain that worsens with activity. RHEUMATOLOGY REVIEW OF SYSTEMS Positives as per HPI Negatives as follows: 
CONSTITUTlONAL:  Denies unexplained persistent fevers, weight change, chronic fatigue HEAD/EYES:   Denies eye redness, blurry vision or sudden loss of vision, dry eyes, HA, temporal artery pain ENT:    Denies oral/nasal ulcers, recurrent sinus infections, dry mouth RESPIRATORY:  No pleuritic pain, history of pleural effusions, hemoptysis, exertional dyspnea CARDIOVASCULAR:  Denies chest pain, history of pericardial effusions GASTRO:   Denies heartburn, esophageal dysmotility, abdominal pain, nausea, vomiting, diarrhea, blood in the stool HEMATOLOGIC:  No easy bruising, purpura, swollen lymph nodes SKIN:    Denies alopecia, ulcers, nodules, sun sensitivity, unexplained persistent rash VASCULAR:   Denies edema, cyanosis, raynaud phenomenon NEUROLOGIC:  Denies specific muscle weakness, paresthesias PSYCHIATRIC:  No sleep disturbance / snoring, depression, anxiety MSK:    No morning stiffness >1 hour, SI joint pain, persistent joint swelling, persistent joint pain PAST MEDICAL, SOCIAL AND FAMILY HISTORY Reviewed with patient, significant changes in medical history - no PHYSICAL EXAM 
Blood pressure 134/81, pulse 85, temperature 98 °F (36.7 °C), temperature source Oral, resp. rate 16, weight 127 lb (57.6 kg), SpO2 97 %. GENERAL APPEARANCE: Well-nourished adult in no acute distress. EYES: No scleral erythema, conjunctival injection. ENT: No oral ulcer, parotid enlargement. NECK: No adenopathy, thyroid enlargement. CARDIOVASCULAR: Heart rhythm is regular. No murmur, rub, gallop. CHEST: Normal vesicular breath sounds. No wheezes, rales, pleural friction rubs. ABDOMINAL: The abdomen is soft and nontender. Liver and spleen are nonpalpable. Bowel sounds are normal. 
EXTREMITIES: There is no evidence of clubbing, cyanosis, edema. SKIN: No rash, palpable purpura, digital ulcer, abnormal thickening. NEUROLOGICAL: Normal gait and station, full strength in upper and lower extremities, normal sensation to light touch. MUSCULOSKELETAL: OA changes noted, no synovitis Upper extremities - full range of motion, no tenderness, no swelling, no synovial thickening and no deformity of joints. Lower extremities - full range of motion, no tenderness, no swelling, no synovial thickening and no deformity of joints. LABS, RADIOLOGY AND PROCEDURES Previous labs reviewed -Yes Previous radiology reviewed -Yes 4/2017 DEXA BONE DENSITY STUDY AXIAL Impression: This patient is osteoporotic using the World Health Organization criteria As compared to the prior study, there has been a significant 4.5% decrease in 
the lumbar spine but no change in the hip. Please assess treatment compliance. 3/2015 DEXA BONE DENSITY STUDY AXIAL IMPRESSION: 
This patient is osteoporotic using the World Health Organization criteria As compared to the prior study, there has been no significant change 10 year probability of major osteoporotic fracture:  7.8% 10 year probability of hip fracture:  1.7% 5/2013 DEXA BONE DENSITY STUDY AXIAL IMPRESSION: 
This patient is osteopenic using the World Health Organization criteria As compared to the prior study, there has been 5.5% increase in the lumbar  
spine and 0.1% increase in the total mean hip, compatible with response to  
treatment 10 year probability of major osteoporotic fracture:  13.9% 10 year probability of hip fracture:  2.3% 2/2011 [facility] DXA BONE DENSITY STUDY AXIAL IMPRESSION: 
This patient is osteoporotic using the World Health Organization criteria 10 year probability of major osteoporotic fracture:  7.3% 10 year probability of hip fracture:  1.5% Previous procedures reviewed -Yes Previous medical records reviewed/summarized -Yes ASSESSMENT 1. Osteoporosis - (Established problem - Progressive disease) - The patient has been doing well on Prolia injections. We will continue with this medication. She is at high risk for osteoporosis, so we discussed continuing this medication for a prolonged period of time. 2. Osteoarthritis (Established problem -  Progressive disease) - We discussed starting Diclofenac topical gel if her thumb pain becomes a persistent issue. She should return in 1 year for a follow up. PLAN 1. Prolia 2. Calcium, Vitamin D 
3. Return in 1 year Bony Fraire MD 
Adult and Pediatric Rheumatology [address], [address] [facility], [address] [place] [address], Phone [phone], Fax [phone] E-mail: Dez Mccollum  of Pediatrics Department of Pediatrics, [facility] of [address] [place] [address], [address] [place] [address], Phone [phone], Fax [phone] E-mail: [email or web address] There are no Patient Instructions on file for this visit. cc: 
Cathy Steward MD 
 
Written by viraj Sanchez, as dictated by Alfonso Christiansen.  Amy Fraire M.D.

## 2019-04-23 ENCOUNTER — APPOINTMENT (OUTPATIENT)
Dept: INFUSION THERAPY | Age: 74
End: 2019-04-23

## 2019-04-26 ENCOUNTER — HOSPITAL ENCOUNTER (OUTPATIENT)
Dept: INFUSION THERAPY | Age: 74
Discharge: HOME | End: 2019-04-26
Payer: MEDICARE

## 2019-04-26 VITALS
DIASTOLIC BLOOD PRESSURE: 81 MMHG | TEMPERATURE: 97.2 F | SYSTOLIC BLOOD PRESSURE: 120 MMHG | HEART RATE: 86 BPM | RESPIRATION RATE: 18 BRPM

## 2019-04-26 LAB
ALBUMIN SERPL-MCNC: 3.5 G/DL (ref 3.5–5)
ANION GAP SERPL CALC-SCNC: 5 MMOL/L (ref 5–15)
BUN SERPL-MCNC: 13 MG/DL (ref 6–20)
BUN/CREAT SERPL: 16 (ref 12–20)
CALCIUM SERPL-MCNC: 8.7 MG/DL (ref 8.5–10.1)
CHLORIDE SERPL-SCNC: 109 MMOL/L (ref 97–108)
CO2 SERPL-SCNC: 26 MMOL/L (ref 21–32)
CREAT SERPL-MCNC: 0.8 MG/DL (ref 0.55–1.02)
GLUCOSE SERPL-MCNC: 91 MG/DL (ref 65–100)
MAGNESIUM SERPL-MCNC: 2.4 MG/DL (ref 1.6–2.4)
PHOSPHATE SERPL-MCNC: 2.9 MG/DL (ref 2.6–4.7)
PHOSPHATE SERPL-MCNC: 3 MG/DL (ref 2.6–4.7)
POTASSIUM SERPL-SCNC: 4.2 MMOL/L (ref 3.5–5.1)
SODIUM SERPL-SCNC: 140 MMOL/L (ref 136–145)

## 2019-04-26 PROCEDURE — 36415 COLL VENOUS BLD VENIPUNCTURE: CPT

## 2019-04-26 PROCEDURE — 80069 RENAL FUNCTION PANEL: CPT

## 2019-04-26 PROCEDURE — 74011250636 HC RX REV CODE- 250/636: Performed by: PEDIATRICS

## 2019-04-26 PROCEDURE — 84100 ASSAY OF PHOSPHORUS: CPT

## 2019-04-26 PROCEDURE — 83735 ASSAY OF MAGNESIUM: CPT

## 2019-04-26 PROCEDURE — 96372 THER/PROPH/DIAG INJ SC/IM: CPT

## 2019-04-26 RX ADMIN — DENOSUMAB 60 MG: 60 INJECTION SUBCUTANEOUS at 12:12

## 2019-04-26 NOTE — PROGRESS NOTES
Outpatient Infusion Center Progress Note 1100 Pt admit to [facility] for Prolia  ambulatory in stable condition. Assessment completed. No new concerns voiced, labs drawn peripherally and sent for processing. Visit Vitals /81 Pulse 86 Temp 97.2 °F (36.2 °C) Resp 18 Medications: 
Prolia SC - left arm 
 
1220 Pt tolerated treatment well. D/c home ambulatory in no distress. Pt aware of next appointment scheduled for 10/18/19.

## 2019-10-17 ENCOUNTER — HOSPITAL ENCOUNTER (OUTPATIENT)
Dept: INFUSION THERAPY | Age: 74
Discharge: HOME | End: 2019-10-17
Payer: MEDICARE

## 2019-10-17 VITALS
SYSTOLIC BLOOD PRESSURE: 139 MMHG | HEART RATE: 74 BPM | DIASTOLIC BLOOD PRESSURE: 78 MMHG | RESPIRATION RATE: 18 BRPM | TEMPERATURE: 98 F

## 2019-10-17 LAB
ALBUMIN SERPL-MCNC: 3.6 G/DL (ref 3.5–5)
ANION GAP SERPL CALC-SCNC: 4 MMOL/L (ref 5–15)
BUN SERPL-MCNC: 15 MG/DL (ref 6–20)
BUN/CREAT SERPL: 20 (ref 12–20)
CALCIUM SERPL-MCNC: 8.4 MG/DL (ref 8.5–10.1)
CHLORIDE SERPL-SCNC: 109 MMOL/L (ref 97–108)
CO2 SERPL-SCNC: 27 MMOL/L (ref 21–32)
CREAT SERPL-MCNC: 0.75 MG/DL (ref 0.55–1.02)
GLUCOSE SERPL-MCNC: 86 MG/DL (ref 65–100)
MAGNESIUM SERPL-MCNC: 2.2 MG/DL (ref 1.6–2.4)
PHOSPHATE SERPL-MCNC: 3.6 MG/DL (ref 2.6–4.7)
PHOSPHATE SERPL-MCNC: 3.8 MG/DL (ref 2.6–4.7)
POTASSIUM SERPL-SCNC: 4.1 MMOL/L (ref 3.5–5.1)
SODIUM SERPL-SCNC: 140 MMOL/L (ref 136–145)

## 2019-10-17 PROCEDURE — 84100 ASSAY OF PHOSPHORUS: CPT

## 2019-10-17 PROCEDURE — 83735 ASSAY OF MAGNESIUM: CPT

## 2019-10-17 PROCEDURE — 96372 THER/PROPH/DIAG INJ SC/IM: CPT

## 2019-10-17 PROCEDURE — 80069 RENAL FUNCTION PANEL: CPT

## 2019-10-17 PROCEDURE — 74011250636 HC RX REV CODE- 250/636: Performed by: PEDIATRICS

## 2019-10-17 PROCEDURE — 36415 COLL VENOUS BLD VENIPUNCTURE: CPT

## 2019-10-17 RX ADMIN — DENOSUMAB 60 MG: 60 INJECTION SUBCUTANEOUS at 14:48

## 2019-10-17 NOTE — PROGRESS NOTES
\[facility] [place] [facility]\"" Short Note                       Date: 2019    Name: Prem Alexis    MRN: [id number]         : 1945      Pt admit to [facility] for Prolia q6 months ambulatory in stable condition. Assessment completed. No new concerns voiced. Please review pending lab results in [address] [place] [address]. Labs drawn and sent for processing. Ms. Boothe's vitals were reviewed prior to treatment. Patient Vitals for the past 12 hrs:   Temp Pulse Resp BP   10/17/19 1318 98 °F (36.7 °C) 74 18 139/78         Lab results were obtained and reviewed. Recent Results (from the past 12 hour(s))   RENAL FUNCTION PANEL    Collection Time: 10/17/19  1:25 PM   Result Value Ref Range    Sodium 140 136 - 145 mmol/L    Potassium 4.1 3.5 - 5.1 mmol/L    Chloride 109 (H) 97 - 108 mmol/L    CO2 27 21 - 32 mmol/L    Anion gap 4 (L) 5 - 15 mmol/L    Glucose 86 65 - 100 mg/dL    BUN 15 6 - 20 MG/DL    Creatinine 0.75 0.55 - 1.02 MG/DL    BUN/Creatinine ratio 20 12 - 20      GFR est AA >60 >60 ml/min/1.73m2    GFR est non-AA >60 >60 ml/min/1.73m2    Calcium 8.4 (L) 8.5 - 10.1 MG/DL    Phosphorus 3.6 2.6 - 4.7 MG/DL    Albumin 3.6 3.5 - 5.0 g/dL   MAGNESIUM    Collection Time: 10/17/19  1:25 PM   Result Value Ref Range    Magnesium 2.2 1.6 - 2.4 mg/dL   PHOSPHORUS    Collection Time: 10/17/19  1:27 PM   Result Value Ref Range    Phosphorus 3.8 2.6 - 4.7 MG/DL       Medications given: left arm SQ  Medications Administered     denosumab (PROLIA) injection 60 mg     Admin Date  10/17/2019 Action  Given Dose  60 mg Route  SubCUTAneous Administered By  Dorothy Quiroz RN              2552. Ms. Jairo Cross was discharged from [place] [address] in stable condition.   Future Appointments   Date Time Provider Cameron Williamson   2020  8:30 AM [facility]ION NURSE 6 [facility] [address] [place] [address] GEOFF Coates RN  2019  2:59 PM

## 2019-10-18 ENCOUNTER — APPOINTMENT (OUTPATIENT)
Dept: INFUSION THERAPY | Age: 74
End: 2019-10-18

## 2019-11-08 ENCOUNTER — HOSPITAL ENCOUNTER (OUTPATIENT)
Dept: MAMMOGRAPHY | Age: 74
Discharge: HOME | End: 2019-11-08
Attending: INTERNAL MEDICINE
Payer: MEDICARE

## 2019-11-08 DIAGNOSIS — M81.0 OSTEOPOROSIS: ICD-10-CM

## 2019-11-08 PROCEDURE — 77080 DXA BONE DENSITY AXIAL: CPT

## 2020-02-07 ENCOUNTER — HOSPITAL ENCOUNTER (OUTPATIENT)
Dept: MAMMOGRAPHY | Age: 75
Discharge: HOME | End: 2020-02-07
Attending: INTERNAL MEDICINE
Payer: MEDICARE

## 2020-02-07 DIAGNOSIS — Z12.31 VISIT FOR SCREENING MAMMOGRAM: ICD-10-CM

## 2020-02-07 PROCEDURE — 77063 BREAST TOMOSYNTHESIS BI: CPT

## 2020-04-17 ENCOUNTER — HOSPITAL ENCOUNTER (OUTPATIENT)
Dept: INFUSION THERAPY | Age: 75
Discharge: HOME | End: 2020-04-17
Payer: MEDICARE

## 2020-04-17 VITALS
DIASTOLIC BLOOD PRESSURE: 83 MMHG | SYSTOLIC BLOOD PRESSURE: 127 MMHG | TEMPERATURE: 97.6 F | HEART RATE: 84 BPM | RESPIRATION RATE: 18 BRPM

## 2020-04-17 LAB
ALBUMIN SERPL-MCNC: 3.6 G/DL (ref 3.5–5)
ANION GAP SERPL CALC-SCNC: 5 MMOL/L (ref 5–15)
BUN SERPL-MCNC: 17 MG/DL (ref 6–20)
BUN/CREAT SERPL: 22 (ref 12–20)
CALCIUM SERPL-MCNC: 8.8 MG/DL (ref 8.5–10.1)
CHLORIDE SERPL-SCNC: 107 MMOL/L (ref 97–108)
CO2 SERPL-SCNC: 28 MMOL/L (ref 21–32)
CREAT SERPL-MCNC: 0.78 MG/DL (ref 0.55–1.02)
GLUCOSE SERPL-MCNC: 74 MG/DL (ref 65–100)
MAGNESIUM SERPL-MCNC: 2.2 MG/DL (ref 1.6–2.4)
PHOSPHATE SERPL-MCNC: 3.5 MG/DL (ref 2.6–4.7)
PHOSPHATE SERPL-MCNC: 3.6 MG/DL (ref 2.6–4.7)
POTASSIUM SERPL-SCNC: 3.7 MMOL/L (ref 3.5–5.1)
SODIUM SERPL-SCNC: 140 MMOL/L (ref 136–145)

## 2020-04-17 PROCEDURE — 83735 ASSAY OF MAGNESIUM: CPT

## 2020-04-17 PROCEDURE — 96372 THER/PROPH/DIAG INJ SC/IM: CPT

## 2020-04-17 PROCEDURE — 84100 ASSAY OF PHOSPHORUS: CPT

## 2020-04-17 PROCEDURE — 80069 RENAL FUNCTION PANEL: CPT

## 2020-04-17 PROCEDURE — 36415 COLL VENOUS BLD VENIPUNCTURE: CPT

## 2020-04-17 PROCEDURE — 74011250636 HC RX REV CODE- 250/636: Performed by: PEDIATRICS

## 2020-04-17 RX ADMIN — DENOSUMAB 60 MG: 60 INJECTION SUBCUTANEOUS at 10:18

## 2020-04-17 NOTE — PROGRESS NOTES
[facility]                       Date: 2020    Name: Tomer Frank    MRN: [id number]         : 1945      0830 Pt admit to [facility] for labs/Prolia ambulatory in stable condition. Assessment completed. No new concerns voiced. Labs drawn peripherally and sent for processing. Ms. Baca vitals were reviewed prior to and after treatment. Patient Vitals for the past 12 hrs:   Temp Pulse Resp BP   20 0834 97.6 °F (36.4 °C) 84 18 127/83     Lab results were obtained and reviewed. Recent Results (from the past 12 hour(s))   RENAL FUNCTION PANEL    Collection Time: 20  8:40 AM   Result Value Ref Range    Sodium 140 136 - 145 mmol/L    Potassium 3.7 3.5 - 5.1 mmol/L    Chloride 107 97 - 108 mmol/L    CO2 28 21 - 32 mmol/L    Anion gap 5 5 - 15 mmol/L    Glucose 74 65 - 100 mg/dL    BUN 17 6 - 20 MG/DL    Creatinine 0.78 0.55 - 1.02 MG/DL    BUN/Creatinine ratio 22 (H) 12 - 20      GFR est AA >60 >60 ml/min/1.73m2    GFR est non-AA >60 >60 ml/min/1.73m2    Calcium 8.8 8.5 - 10.1 MG/DL    Phosphorus 3.5 2.6 - 4.7 MG/DL    Albumin 3.6 3.5 - 5.0 g/dL   MAGNESIUM    Collection Time: 20  8:40 AM   Result Value Ref Range    Magnesium 2.2 1.6 - 2.4 mg/dL   PHOSPHORUS    Collection Time: 20  8:40 AM   Result Value Ref Range    Phosphorus 3.6 2.6 - 4.7 MG/DL     Medications given:   Medications Administered     denosumab (PROLIA) injection 60 mg     Admin Date  2020 Action  Given Dose  60 mg Route  SubCUTAneous Administered By  Lamont Wisdom RN              Ms. Marty Chu tolerated the treatment, and had no complaints, and was discharged from [place] [address] in stable condition at 1020.      Future Appointments   Date Time Provider Cameron Fariai   2020  8:20 AM [place] [address] [place]  2020  12:17 PM

## 2020-06-15 ENCOUNTER — VIRTUAL VISIT (OUTPATIENT)
Dept: RHEUMATOLOGY | Age: 75
End: 2020-06-15

## 2020-06-15 DIAGNOSIS — M81.0 OSTEOPOROSIS WITHOUT CURRENT PATHOLOGICAL FRACTURE, UNSPECIFIED OSTEOPOROSIS TYPE: Primary | ICD-10-CM

## 2020-06-15 NOTE — PROGRESS NOTES
Due to the recent COVID19 outbreak, this patient's appointment today was converted to a telephone/video visit. Current outbreak of COVID19- we discussed the current CDC recommendations of practicing social distancing, only going out for needed trips to the store/pharmacy and avoiding groups of 10 or more. Discussed that people with obesity, DM, heart disease and advanced age are likely at increased risk of severe disease if they were to contract the virus. We discussed the rheum-covid project and the registry data which shows that the medications we use do not put patients at higher risk for severe disease. At this time, we are not recommending stopping these medications in asymptomatic people. We discussed holding DMARDs, biologics and TYREE inhibitors in those that are exposed and have been diagnosed with COVID19. We reviewed the previous plan from the last visit. Below is a synopsis of what was covered during the phone/video call. RHEUMATOLOGY PROBLEM LIST AND CHIEF COMPLAINT  1. Osteoporosis - She took Fosamax from 9140-1666. Her bone density did not improve with Fosamax, and she switched to Prolia injections from 1788-7236. She stopped Prolia due to AEs of rash. She switched back to Fosamax, and experienced AEs of musculoskeletal pain. She then switched to Risedronate, but stopped this medication due to AEs. Current medication - prolia    Therapy History:  Current: Prolia (2018-current)       INTERVAL HISTORY  This is a 69 y.o.  female. Today, the patient complains of no pain in the joints. Location: none  Timing: none at this time   Duration:  7 months  Modifying factors:   Context/Associated signs and symptoms: The patient continues on Prolia. DEXA scan reviewed showed increased density of lumbar spine and right hip, but decreased density of left distal third radius. Labs reviewed including CMP were normal. Patient denies any recent fracture.  Patient further denies recent medication or medical change. RHEUMATOLOGY REVIEW OF SYSTEMS   Positives as per HPI  Negatives as follows:  [place]:  Denies unexplained persistent fevers, weight change, chronic fatigue  HEAD/EYES:   Denies eye redness, blurry vision or sudden loss of vision, dry eyes, HA, temporal artery pain  ENT:    Denies oral/nasal ulcers, recurrent sinus infections, dry mouth  RESPIRATORY:  No pleuritic pain, history of pleural effusions, hemoptysis, exertional dyspnea  CARDIOVASCULAR:  Denies chest pain, history of pericardial effusions  GASTRO:   Denies heartburn, esophageal dysmotility, abdominal pain, nausea, vomiting, diarrhea, blood in the stool  HEMATOLOGIC:  No easy bruising, purpura, swollen lymph nodes  SKIN:    Denies alopecia, ulcers, nodules, sun sensitivity, unexplained persistent rash   VASCULAR:   Denies edema, cyanosis, raynaud phenomenon  NEUROLOGIC:  Denies specific muscle weakness, paresthesias   PSYCHIATRIC:  No sleep disturbance / snoring, depression, anxiety  MSK:    No morning stiffness >1 hour, SI joint pain, persistent joint swelling, persistent joint pain    PAST MEDICAL, SOCIAL AND FAMILY HISTORY  Reviewed with patient, significant changes in medical history - no      FAMILY HISTORY  lupus     PHYSICAL EXAM  Patient not fully examined. LABS, RADIOLOGY AND PROCEDURES  Previous labs reviewed -Yes  Previous radiology reviewed -Yes    10/2019 DEXA BONE DENSITY STUDY AXIAL  This patient is osteoporotic using the World Health Organization criteria  As compared to the prior study, there has been an increase in the bone mineral  density of the lumbar spine of 2.2% and of the right total hip of 1.1%, and a  decrease in the bone mineral density of the left distal third radius of 2.6%. 10 year probability of major osteoporotic fracture: 15.2%  10 year probability of hip fracture: 7.7%     4/2017 DEXA BONE DENSITY STUDY AXIAL   Impression:   This patient is osteoporotic using the [address] [address] criteria  As compared to the prior study, there has been a significant 4.5% decrease in  the lumbar spine but no change in the hip. Please assess treatment compliance. 3/2015 DEXA BONE DENSITY STUDY AXIAL   IMPRESSION:  This patient is osteoporotic using the World Health Organization criteria  As compared to the prior study, there has been no significant change  10 year probability of major osteoporotic fracture:  7.8%  10 year probability of hip fracture:  1.7%    5/2013 DEXA BONE DENSITY STUDY AXIAL  IMPRESSION:  This patient is osteopenic using the World Health Organization criteria  As compared to the prior study, there has been 5.5% increase in the lumbar   spine and 0.1% increase in the total mean hip, compatible with response to   treatment  10 year probability of major osteoporotic fracture:  13.9%  10 year probability of hip fracture:  2.3%    2/2011 DUSTY DXA BONE DENSITY STUDY AXIAL  IMPRESSION:  This patient is osteoporotic using the World Health Organization criteria  10 year probability of major osteoporotic fracture:  7.3%  10 year probability of hip fracture:  1.5%      Previous procedures reviewed -Yes  Previous medical records reviewed/summarized -Yes    ASSESSMENT  1. Osteoporosis - (Established problem - Progressive disease) - Patient is on Prolia q6 months. We discussed other medication options including Evenity, Forteo, Tymlos for preventing progressive bone loss. We will not switch patient at this time in order to keep these medications as an option for future use. She has not had any fragility fractures. Advised patient to continue Prolia q6 months. Recommended calcium and vitamin D.      2. Osteoarthritis (Established problem -  Stable disease) - We did not discuss this issue today. She can continue to use Diclofenac topical for any recurrent thumb pain. She should return in 1 year for a follow up. PLAN  1. Prolia q6 months  2. Calcium, Vitamin D  3. Return in 1 year    Bony Lozada, MD  Adult and Pediatric Rheumatology     [facility]   [address] [facility], [address] [place] [address], Phone [phone], Fax [phone]     Visiting  of Pediatrics    Department of Pediatrics, [facility] of [address] [place] [address], [address] [place] [address], Phone [phone], Fax [phone]    There are no Patient Instructions on file for this visit.     cc:  Raquel Weller MD    Written by viraj Rangel, as dictated by Dr. Sangita Hercules M.D.

## 2020-10-16 ENCOUNTER — APPOINTMENT (OUTPATIENT)
Dept: INFUSION THERAPY | Age: 75
End: 2020-10-16

## 2020-10-21 NOTE — PROGRESS NOTES
New/updated order required for patient's upcoming [facility] appointment.  Faxed doctor's office on 10/21/20 at 8:07 Rudi ByrneD

## 2020-10-23 ENCOUNTER — HOSPITAL ENCOUNTER (OUTPATIENT)
Dept: INFUSION THERAPY | Age: 75
Discharge: HOME | End: 2020-10-23
Payer: MEDICARE

## 2020-10-23 VITALS
TEMPERATURE: 97.5 F | DIASTOLIC BLOOD PRESSURE: 79 MMHG | HEART RATE: 89 BPM | SYSTOLIC BLOOD PRESSURE: 124 MMHG | RESPIRATION RATE: 18 BRPM

## 2020-10-23 LAB
ALBUMIN SERPL-MCNC: 3.6 G/DL (ref 3.5–5)
ANION GAP SERPL CALC-SCNC: 5 MMOL/L (ref 5–15)
BUN SERPL-MCNC: 16 MG/DL (ref 6–20)
BUN/CREAT SERPL: 18 (ref 12–20)
CALCIUM SERPL-MCNC: 9 MG/DL (ref 8.5–10.1)
CHLORIDE SERPL-SCNC: 106 MMOL/L (ref 97–108)
CO2 SERPL-SCNC: 30 MMOL/L (ref 21–32)
CREAT SERPL-MCNC: 0.87 MG/DL (ref 0.55–1.02)
GLUCOSE SERPL-MCNC: 59 MG/DL (ref 65–100)
MAGNESIUM SERPL-MCNC: 2.2 MG/DL (ref 1.6–2.4)
PHOSPHATE SERPL-MCNC: 3.8 MG/DL (ref 2.6–4.7)
PHOSPHATE SERPL-MCNC: 3.8 MG/DL (ref 2.6–4.7)
POTASSIUM SERPL-SCNC: 3.8 MMOL/L (ref 3.5–5.1)
SODIUM SERPL-SCNC: 141 MMOL/L (ref 136–145)

## 2020-10-23 PROCEDURE — 84100 ASSAY OF PHOSPHORUS: CPT

## 2020-10-23 PROCEDURE — 36415 COLL VENOUS BLD VENIPUNCTURE: CPT

## 2020-10-23 PROCEDURE — 80069 RENAL FUNCTION PANEL: CPT

## 2020-10-23 PROCEDURE — 96372 THER/PROPH/DIAG INJ SC/IM: CPT

## 2020-10-23 PROCEDURE — 74011250636 HC RX REV CODE- 250/636: Performed by: PEDIATRICS

## 2020-10-23 PROCEDURE — 83735 ASSAY OF MAGNESIUM: CPT

## 2020-10-23 RX ADMIN — DENOSUMAB 60 MG: 60 INJECTION SUBCUTANEOUS at 09:25

## 2020-10-23 NOTE — PROGRESS NOTES
[facility]patient [facility]fusion [facility] Short Visit Progress Note    0880 Pt admit to [facility] for Prolia ambulatory in stable condition. Assessment completed. No new concerns voiced. Labs drawn peripherally and sent for processing. Patient Vitals for the past 12 hrs:   Temp Pulse Resp BP   10/23/20 0818 97.5 °F (36.4 °C) 89 18 124/79       Medications Administered     denosumab (PROLIA) injection 60 mg     Admin Date  10/23/2020 Action  Given Dose  60 mg Route  SubCUTAneous Administered By  Cristina Jones RN              (SC L arm)    9521 Pt tolerated treatment well. D/c home ambulatory in no distress. Pt aware of next appointment scheduled for 04/23/2021.     Recent Results (from the past 12 hour(s))   RENAL FUNCTION PANEL    Collection Time: 10/23/20  8:20 AM   Result Value Ref Range    Sodium 141 136 - 145 mmol/L    Potassium 3.8 3.5 - 5.1 mmol/L    Chloride 106 97 - 108 mmol/L    CO2 30 21 - 32 mmol/L    Anion gap 5 5 - 15 mmol/L    Glucose 59 (L) 65 - 100 mg/dL    BUN 16 6 - 20 MG/DL    Creatinine 0.87 0.55 - 1.02 MG/DL    BUN/Creatinine ratio 18 12 - 20      GFR est AA >60 >60 ml/min/1.73m2    GFR est non-AA >60 >60 ml/min/1.73m2    Calcium 9.0 8.5 - 10.1 MG/DL    Phosphorus 3.8 2.6 - 4.7 MG/DL    Albumin 3.6 3.5 - 5.0 g/dL   MAGNESIUM    Collection Time: 10/23/20  8:20 AM   Result Value Ref Range    Magnesium 2.2 1.6 - 2.4 mg/dL   PHOSPHORUS    Collection Time: 10/23/20  8:20 AM   Result Value Ref Range    Phosphorus 3.8 2.6 - 4.7 MG/DL

## 2021-01-18 ENCOUNTER — IMMUNIZATION (OUTPATIENT)
Dept: INTERNAL MEDICINE CLINIC | Age: 76
End: 2021-01-18
Payer: MEDICARE

## 2021-01-18 ENCOUNTER — TRANSCRIBE ORDER (OUTPATIENT)
Dept: SCHEDULING | Age: 76
End: 2021-01-18

## 2021-01-18 DIAGNOSIS — Z12.31 SCREENING MAMMOGRAM FOR HIGH-RISK PATIENT: Primary | ICD-10-CM

## 2021-01-18 DIAGNOSIS — Z23 ENCOUNTER FOR IMMUNIZATION: Primary | ICD-10-CM

## 2021-01-18 PROCEDURE — 0011A COVID-19, MRNA, LNP-S, PF, 100MCG/0.5ML DOSE(MODERNA): CPT | Performed by: FAMILY MEDICINE

## 2021-01-18 PROCEDURE — 91301 COVID-19, MRNA, LNP-S, PF, 100MCG/0.5ML DOSE(MODERNA): CPT | Performed by: FAMILY MEDICINE

## 2021-02-15 ENCOUNTER — IMMUNIZATION (OUTPATIENT)
Dept: INTERNAL MEDICINE CLINIC | Age: 76
End: 2021-02-15
Payer: MEDICARE

## 2021-02-15 DIAGNOSIS — Z23 ENCOUNTER FOR IMMUNIZATION: Primary | ICD-10-CM

## 2021-02-15 PROCEDURE — 91301 COVID-19, MRNA, LNP-S, PF, 100MCG/0.5ML DOSE(MODERNA): CPT | Performed by: FAMILY MEDICINE

## 2021-02-15 PROCEDURE — 0012A COVID-19, MRNA, LNP-S, PF, 100MCG/0.5ML DOSE(MODERNA): CPT | Performed by: FAMILY MEDICINE

## 2021-02-26 ENCOUNTER — HOSPITAL ENCOUNTER (OUTPATIENT)
Dept: MAMMOGRAPHY | Age: 76
Discharge: HOME | End: 2021-02-26
Attending: INTERNAL MEDICINE
Payer: MEDICARE

## 2021-02-26 DIAGNOSIS — Z12.31 SCREENING MAMMOGRAM FOR HIGH-RISK PATIENT: ICD-10-CM

## 2021-02-26 PROCEDURE — 77067 SCR MAMMO BI INCL CAD: CPT

## 2021-04-09 ENCOUNTER — HOSPITAL ENCOUNTER (OUTPATIENT)
Dept: INFUSION THERAPY | Age: 76
Discharge: HOME | End: 2021-04-09
Payer: MEDICARE

## 2021-04-09 VITALS
HEART RATE: 88 BPM | RESPIRATION RATE: 18 BRPM | SYSTOLIC BLOOD PRESSURE: 117 MMHG | DIASTOLIC BLOOD PRESSURE: 73 MMHG | TEMPERATURE: 96.9 F

## 2021-04-09 PROCEDURE — 96372 THER/PROPH/DIAG INJ SC/IM: CPT

## 2021-04-09 PROCEDURE — 74011250636 HC RX REV CODE- 250/636: Performed by: PEDIATRICS

## 2021-04-09 RX ADMIN — DENOSUMAB 60 MG: 60 INJECTION SUBCUTANEOUS at 08:30

## 2021-04-09 NOTE — PROGRESS NOTES
[facility] Progress Note    Date: April 9, 2021        0815: Pt arrived ambulatory to [facility] for Prolia in stable condition. Assessment completed. No new concerns voiced. Labs obtained at PCP Vj Cisneros office on 4/5/21. Verbally confirmed calcium level of 9.0 over the phone. Patient denies any symptoms of COVID-19, including SOB, coughing, fever, or generally not feeling well. Patient denies any recent exposure to COVID-19. Patient denies any recent contact with family or friends that have a pending COVID-19 test.      Patient Vitals for the past 12 hrs:   Temp Pulse Resp BP   04/09/21 0818 96.9 °F (36.1 °C) 88 18 117/73     Medications Administered     denosumab (PROLIA) injection 60 mg     Admin Date  04/09/2021 Action  Given Dose  60 mg Route  SubCUTAneous Administered By  Early Dynes              Given SQ to left upper arm      0835: Tolerated treatment well, no adverse reactions noted. D/Cd from [facility] ambulatory and in no distress. Patient is aware of next scheduled [facility] appointment on 10/22/21.     Future Appointments   Date Time Provider Cameron Williamson   4/16/2021  8:40 AM MD SHAHEEN Joy   10/22/2021  8:30 AM H1 [facility]           Cline Lefort, RN  April 9, 2021

## 2021-04-16 ENCOUNTER — OFFICE VISIT (OUTPATIENT)
Dept: CARDIOLOGY CLINIC | Age: 76
End: 2021-04-16
Payer: MEDICARE

## 2021-04-16 VITALS
HEIGHT: 61 IN | SYSTOLIC BLOOD PRESSURE: 110 MMHG | HEART RATE: 90 BPM | DIASTOLIC BLOOD PRESSURE: 70 MMHG | BODY MASS INDEX: 24.73 KG/M2 | OXYGEN SATURATION: 98 % | RESPIRATION RATE: 16 BRPM | WEIGHT: 131 LBS

## 2021-04-16 DIAGNOSIS — E78.00 HYPERCHOLESTEREMIA: ICD-10-CM

## 2021-04-16 DIAGNOSIS — R00.2 PALPITATION: ICD-10-CM

## 2021-04-16 DIAGNOSIS — Z82.49 FAMILY HISTORY OF CARDIAC DISORDER: ICD-10-CM

## 2021-04-16 DIAGNOSIS — R06.02 SOB (SHORTNESS OF BREATH): ICD-10-CM

## 2021-04-16 PROCEDURE — 93000 ELECTROCARDIOGRAM COMPLETE: CPT | Performed by: SPECIALIST

## 2021-04-16 PROCEDURE — 99204 OFFICE O/P NEW MOD 45 MIN: CPT | Performed by: SPECIALIST

## 2021-04-16 PROCEDURE — 1090F PRES/ABSN URINE INCON ASSESS: CPT | Performed by: SPECIALIST

## 2021-04-16 RX ORDER — ERGOCALCIFEROL 1.25 MG/1
CAPSULE ORAL
COMMUNITY
Start: 2021-04-05 | End: 2021-04-16 | Stop reason: SDUPTHER

## 2021-04-16 RX ORDER — LOVASTATIN 10 MG/1
10 TABLET ORAL DAILY
COMMUNITY
Start: 2021-03-16 | End: 2022-07-06

## 2021-04-16 NOTE — PROGRESS NOTES
Rio Tl     1945       Lin Nguyen MD, [facility]  Date of Visit-4/16/2021   PCP is Denny Escobedo MD   [facility]  [facility]  HPI:  Mayito Dangelo is a 77 y.o. female   Pt last seen in 2016 for SOB. EKG showed SR. Echo in 2016 was normal. Routine treadmill in 2016 was normal. Walked 6 minutes. EKG today shows SR with LVH. This compares to EKG of 2016 that did not demonstrate LVH. Overall the pt states she is doing well. She notes she sometimes gets SOB, mostly with exertion. She has not noticed worsening SOB when laying flat. Pt denies any dizziness when standing. She states she has never had high BP and has never been on a BP medication. She received the COVID vaccine. Her sister was recently diagnosed with CAD and needed stents. Denies chest pain, edema, syncope or shortness of breath at rest, has no tachycardia, palpitations or sense of arrhythmia. EKG: Sinus  Rhythm   Low voltage in precordial leads.    -Left axis -anterior fascicular block. Voltage criteria for LVH  (R(aVL) exceeds 1.01 mV)  -Voltage criteria w/o ST/T abnormality may be normal.     Assessment/Plan:     1. SOB (shortness of breath)  Nonspecific dyspnea. No PND. She is a bit vague on the sx's. She is mainly here because she is concerned about her family hx. Given those findings will plan a nuclear stress test and echo. Will also obtain a coronary calcium score. 2. Family history of cardiac disorder  Her sister in [facility] has heart disease now. She is on no BP meds and BP is normal today. 3. Hypercholesteremia  Need to clarify which statin she is taking and get a copy of her last numbers. At goal , denies excess muscle aches or new liver issues  Key Antihyperlipidemia Meds             lovastatin (MEVACOR) 10 mg tablet (Taking) Take 10 mg by mouth daily. No results found for: LDL, LDLC, DLDLP    4. Palpitation  Stable.    - AMB POC EKG ROUTINE W/ 12 LEADS, INTER & REP     Patient Instructions   You have been scheduled for a nuclear stress test and echocardiogram. Please have your Coronary Calcium Score completed by calling [phone]. We will call with results of all three tests. Nuclear:    Please arrive 15 minutes prior to your appointment. Wear comfortable clothing and walking or athletic shoes. Do not eat or drink anything, except water, for at least 2 hours prior to your appointment. Avoid tobacco products for at least 6 hours prior to your test.    Do not eat or drink anything containing caffeine, including but not limited to the following: chocolate, regular and decaffeinated coffee, soft drinks, or tea for at least 12-24 hours prior to your test.    Do not hold your scheduled medications prior to your test.     Your test will be performed on a 1 day protocol. This is determined by your height, weight, and other risk factors. For a 1 day test, please allow for 4 hours in the office that day. Future Appointments   Date Time Provider Cameron Williamson   5/28/2021  8:00 AM EMMA MEDEIROS  AMB   5/28/2021  9:00 AM ECHOEMMA AMB   10/22/2021  8:30 AM [address]            Impression:   1. SOB (shortness of breath)    2. Family history of cardiac disorder    3. Hypercholesteremia    4. Palpitation       Cardiac History:    March 2011 with nonexertional chest discomfort, felt to be atypical pain. Stress nuclear in 2011 Stress test 3/2/11, walked 6 minutes and 41 seconds, normal perfusion. Dyslipidemia:   Echo Sab Feb 2016 WNL, ETT WNL     Review of Systems   Constitutional: Negative for diaphoresis, fever and malaise/fatigue. HENT: Negative for ear pain, hearing loss, nosebleeds and tinnitus. Eyes: Negative for blurred vision, double vision and pain. Respiratory: Positive for shortness of breath. Negative for cough, hemoptysis, sputum production, wheezing and stridor.     Cardiovascular: Negative for palpitations, orthopnea, claudication and leg swelling. Gastrointestinal: Negative for abdominal pain, blood in stool, constipation, diarrhea, heartburn, nausea and vomiting. Genitourinary: Negative for dysuria, frequency and urgency. Musculoskeletal: Positive for joint pain. Negative for back pain and falls. Skin: Negative for rash. Neurological: Negative for dizziness, seizures, loss of consciousness, weakness and headaches. Endo/Heme/Allergies: Does not bruise/bleed easily. Psychiatric/Behavioral: Negative for depression, hallucinations and memory loss. The patient is not nervous/anxious and does not have insomnia. see supplement sheet, initialed and to be scanned by staff  Past Medical History:   Diagnosis Date    Family history of cardiac disorder 2016    Hypercholesteremia 2016    Menopause     LMP-unknown      Social Hx= reports that she has never smoked. She has never used smokeless tobacco. She reports that she does not drink alcohol or use drugs. 0 alcohol drinks a week 1 cup coffee dialy  3 kids    family history includes Heart Disease in her brother, father, and mother; Pacemaker in her brother and sister. mother  78 father  72, 3 sibs with CAD  Allergies   Allergen Reactions    Codeine Other (comments)     dizziness      Exam and Labs:  /70   Pulse 90   Resp 16   Ht 5' 1\" (1.549 m)   Wt 131 lb (59.4 kg)   SpO2 98%   BMI 24.75 kg/m² Constitutional:  NAD, comfortable  Head: NC,AT. Eyes: No scleral icterus. Neck:  Neck supple. No JVD present. Throat: moist mucous membranes. Chest: Effort normal & normal respiratory excursion . Neurological: alert, conversant and oriented . Skin: Skin is not cold. No obvious systemic rash noted. Not diaphoretic. No erythema. Psychiatric:  Grossly normal mood and affect. Behavior appears normal. Extremities:  no clubbing or cyanosis. Abdomen: non distended    Lungs:breath sounds normal. No stridor.  distress, wheezes or  Rales. Heart: normal rate, regular rhythm, normal S1, S2, no murmurs, rubs, clicks or gallops , PMI non displaced. Edema: Edema is none. No results found for: CHOL, CHOLX, CHLST, CHOLV, HDL, HDLP, LDL, LDLC, DLDLP, TGLX, TRIGL, TRIGP, CHHD, CHHDX  Lab Results   Component Value Date/Time    Sodium 141 10/23/2020 08:20 AM    Potassium 3.8 10/23/2020 08:20 AM    Chloride 106 10/23/2020 08:20 AM    CO2 30 10/23/2020 08:20 AM    Anion gap 5 10/23/2020 08:20 AM    Glucose 59 (L) 10/23/2020 08:20 AM    BUN 16 10/23/2020 08:20 AM    Creatinine 0.87 10/23/2020 08:20 AM    BUN/Creatinine ratio 18 10/23/2020 08:20 AM    GFR est AA >60 10/23/2020 08:20 AM    GFR est non-AA >60 10/23/2020 08:20 AM    Calcium 9.0 10/23/2020 08:20 AM      Wt Readings from Last 3 Encounters:   04/16/21 131 lb (59.4 kg)   04/22/19 127 lb (57.6 kg)   10/26/18 134 lb 3.2 oz (60.9 kg)      BP Readings from Last 3 Encounters:   04/16/21 110/70   04/09/21 117/73   10/23/20 124/79      Current Outpatient Medications   Medication Sig    lovastatin (MEVACOR) 10 mg tablet Take 10 mg by mouth daily.  calcium carbonate (OS-MANJU) 500 mg calcium (1,250 mg) tablet Take  by mouth daily.  denosumab (PROLIA) 60 mg/mL injection 60 mg by SubCUTAneous route every 6 months.  cholecalciferol (VITAMIN D3) 50,000 unit capsule Take  by mouth every seven (7) days.  cyanocobalamin 1,000 mcg tablet Take 1,500 mcg by mouth daily. No current facility-administered medications for this visit. Impression see above.       Written by Leslee Segal, as dictated by Ursula Restrepo MD.

## 2021-04-16 NOTE — PATIENT INSTRUCTIONS
You have been scheduled for a nuclear stress test and echocardiogram. Please have your Coronary Calcium Score completed by calling [phone]. We will call with results of all three tests. Nuclear: 
 
Please arrive 15 minutes prior to your appointment. Wear comfortable clothing and walking or athletic shoes. Do not eat or drink anything, except water, for at least 2 hours prior to your appointment. Avoid tobacco products for at least 6 hours prior to your test. 
 
Do not eat or drink anything containing caffeine, including but not limited to the following: chocolate, regular and decaffeinated coffee, soft drinks, or tea for at least 12-24 hours prior to your test. 
 
Do not hold your scheduled medications prior to your test.  
 
Your test will be performed on a 1 day protocol. This is determined by your height, weight, and other risk factors. For a 1 day test, please allow for 4 hours in the office that day.

## 2021-04-16 NOTE — PROGRESS NOTES
Visit Vitals  /70   Pulse 90   Resp 16   Ht 5' 1\" (1.549 m)   Wt 131 lb (59.4 kg)   SpO2 98%   BMI 24.75 kg/m²

## 2021-04-16 NOTE — Clinical Note
4/16/2021 Patient: Josh Baez YOB: 1945 Date of Visit: 4/16/2021 Federica Davila MD 
[address] [address] [address] Fax: [phone] Dear Federica Davila MD, Thank you for referring Ms. Rio Boothe to [facility] for evaluation. My notes for this consultation are attached. If you have questions, please do not hesitate to call me. I look forward to following your patient along with you.  
 
 
Sincerely, 
 
Jenae Cosby MD

## 2021-04-23 ENCOUNTER — APPOINTMENT (OUTPATIENT)
Dept: INFUSION THERAPY | Age: 76
End: 2021-04-23

## 2021-05-28 ENCOUNTER — ANCILLARY PROCEDURE (OUTPATIENT)
Dept: CARDIOLOGY CLINIC | Age: 76
End: 2021-05-28
Payer: MEDICARE

## 2021-05-28 ENCOUNTER — ANCILLARY PROCEDURE (OUTPATIENT)
Dept: CARDIOLOGY CLINIC | Age: 76
End: 2021-05-28

## 2021-05-28 VITALS — BODY MASS INDEX: 24.73 KG/M2 | HEIGHT: 61 IN | WEIGHT: 131 LBS

## 2021-05-28 VITALS
BODY MASS INDEX: 24.73 KG/M2 | HEIGHT: 61 IN | WEIGHT: 131 LBS | DIASTOLIC BLOOD PRESSURE: 70 MMHG | SYSTOLIC BLOOD PRESSURE: 110 MMHG

## 2021-05-28 DIAGNOSIS — E78.00 HYPERCHOLESTEREMIA: ICD-10-CM

## 2021-05-28 DIAGNOSIS — R06.02 SOB (SHORTNESS OF BREATH): ICD-10-CM

## 2021-05-28 DIAGNOSIS — R00.2 PALPITATION: ICD-10-CM

## 2021-05-28 DIAGNOSIS — Z82.49 FAMILY HISTORY OF CARDIAC DISORDER: ICD-10-CM

## 2021-05-28 PROCEDURE — 93015 CV STRESS TEST SUPVJ I&R: CPT | Performed by: SPECIALIST

## 2021-05-28 PROCEDURE — 93306 TTE W/DOPPLER COMPLETE: CPT | Performed by: SPECIALIST

## 2021-05-28 PROCEDURE — 78452 HT MUSCLE IMAGE SPECT MULT: CPT | Performed by: SPECIALIST

## 2021-05-28 PROCEDURE — A9500 TC99M SESTAMIBI: HCPCS | Performed by: SPECIALIST

## 2021-05-28 RX ORDER — TETRAKIS(2-METHOXYISOBUTYLISOCYANIDE)COPPER(I) TETRAFLUOROBORATE 1 MG/ML
30 INJECTION, POWDER, LYOPHILIZED, FOR SOLUTION INTRAVENOUS ONCE
Status: COMPLETED | OUTPATIENT
Start: 2021-05-28 | End: 2021-05-28

## 2021-05-28 RX ORDER — TETRAKIS(2-METHOXYISOBUTYLISOCYANIDE)COPPER(I) TETRAFLUOROBORATE 1 MG/ML
10 INJECTION, POWDER, LYOPHILIZED, FOR SOLUTION INTRAVENOUS ONCE
Status: COMPLETED | OUTPATIENT
Start: 2021-05-28 | End: 2021-05-28

## 2021-05-28 RX ADMIN — TETRAKIS(2-METHOXYISOBUTYLISOCYANIDE)COPPER(I) TETRAFLUOROBORATE 26.1 MILLICURIE: 1 INJECTION, POWDER, LYOPHILIZED, FOR SOLUTION INTRAVENOUS at 09:45

## 2021-05-28 RX ADMIN — TETRAKIS(2-METHOXYISOBUTYLISOCYANIDE)COPPER(I) TETRAFLUOROBORATE 8.1 MILLICURIE: 1 INJECTION, POWDER, LYOPHILIZED, FOR SOLUTION INTRAVENOUS at 08:05

## 2021-05-29 LAB
ECHO AO ASC DIAM: 2.75 CM
ECHO AO ROOT DIAM: 3 CM
ECHO AV AREA PEAK VELOCITY: 2.44 CM2
ECHO AV AREA VTI: 2.54 CM2
ECHO AV AREA/BSA PEAK VELOCITY: 1.5 CM2/M2
ECHO AV AREA/BSA VTI: 1.6 CM2/M2
ECHO AV MEAN GRADIENT: 2.12 MMHG
ECHO AV PEAK GRADIENT: 4.19 MMHG
ECHO AV PEAK VELOCITY: 102.37 CM/S
ECHO AV VTI: 20.01 CM
ECHO EST RA PRESSURE: 3 MMHG
ECHO IVC PROX: 1.04 CM
ECHO LA AREA 4C: 11.85 CM2
ECHO LA MAJOR AXIS: 2.84 CM
ECHO LA MINOR AXIS: 1.8 CM
ECHO LA VOL 2C: 23.34 ML (ref 22–52)
ECHO LA VOL 4C: 27.67 ML (ref 22–52)
ECHO LA VOL BP: 28.42 ML (ref 22–52)
ECHO LA VOL/BSA BIPLANE: 17.99 ML/M2 (ref 16–28)
ECHO LA VOLUME INDEX A2C: 14.77 ML/M2 (ref 16–28)
ECHO LA VOLUME INDEX A4C: 17.51 ML/M2 (ref 16–28)
ECHO LV E' LATERAL VELOCITY: 8.01 CM/S
ECHO LV E' SEPTAL VELOCITY: 6.11 CM/S
ECHO LV EDV A2C: 44.3 ML
ECHO LV EDV A4C: 44.23 ML
ECHO LV EDV BP: 44.9 ML (ref 56–104)
ECHO LV EDV INDEX A4C: 28 ML/M2
ECHO LV EDV INDEX BP: 28.4 ML/M2
ECHO LV EDV NDEX A2C: 28 ML/M2
ECHO LV EJECTION FRACTION A2C: 55 PERCENT
ECHO LV EJECTION FRACTION A4C: 53 PERCENT
ECHO LV EJECTION FRACTION BIPLANE: 53.6 PERCENT (ref 55–100)
ECHO LV ESV A2C: 20.12 ML
ECHO LV ESV A4C: 20.85 ML
ECHO LV ESV BP: 20.83 ML (ref 19–49)
ECHO LV ESV INDEX A2C: 12.7 ML/M2
ECHO LV ESV INDEX A4C: 13.2 ML/M2
ECHO LV ESV INDEX BP: 13.2 ML/M2
ECHO LV INTERNAL DIMENSION DIASTOLIC: 3.32 CM (ref 3.9–5.3)
ECHO LV INTERNAL DIMENSION SYSTOLIC: 2.27 CM
ECHO LV IVSD: 0.75 CM (ref 0.6–0.9)
ECHO LV MASS 2D: 63.9 G (ref 67–162)
ECHO LV MASS INDEX 2D: 40.4 G/M2 (ref 43–95)
ECHO LV POSTERIOR WALL DIASTOLIC: 0.76 CM (ref 0.6–0.9)
ECHO LVOT DIAM: 1.94 CM
ECHO LVOT PEAK GRADIENT: 2.87 MMHG
ECHO LVOT PEAK VELOCITY: 84.7 CM/S
ECHO LVOT SV: 50.9 ML
ECHO LVOT VTI: 17.22 CM
ECHO MV A VELOCITY: 70.49 CM/S
ECHO MV AREA PHT: 5.11 CM2
ECHO MV E DECELERATION TIME (DT): 148.55 MS
ECHO MV E VELOCITY: 42.49 CM/S
ECHO MV E/A RATIO: 0.6
ECHO MV E/E' LATERAL: 5.3
ECHO MV E/E' RATIO (AVERAGED): 6.13
ECHO MV E/E' SEPTAL: 6.95
ECHO MV PRESSURE HALF TIME (PHT): 43.08 MS
ECHO RA MAJOR AXIS: 2.9 CM
ECHO RIGHT VENTRICULAR SYSTOLIC PRESSURE (RVSP): 14 MMHG
ECHO RV INTERNAL DIMENSION: 2.62 CM
ECHO RV TAPSE: 1.15 CM (ref 1.5–2)
ECHO TV REGURGITANT MAX VELOCITY: 188.5 CM/S
ECHO TV REGURGITANT PEAK GRADIENT: 14.22 MMHG
LA VOL DISK BP: 26.11 ML (ref 22–52)
LVOT MG: 1.61 MMHG
STRESS ANGINA INDEX: 0
STRESS BASELINE DIAS BP: 84 MMHG
STRESS BASELINE HR: 82 BPM
STRESS BASELINE SYS BP: 126 MMHG
STRESS ESTIMATED WORKLOAD: 7.2 METS
STRESS EXERCISE DUR MIN: NORMAL
STRESS O2 SAT PEAK: 100 %
STRESS O2 SAT REST: 96 %
STRESS PEAK DIAS BP: 82 MMHG
STRESS PEAK SYS BP: 184 MMHG
STRESS PERCENT HR ACHIEVED: 108 %
STRESS POST PEAK HR: 157 BPM
STRESS RATE PRESSURE PRODUCT: NORMAL BPM*MMHG
STRESS ST DEPRESSION: 0 MM
STRESS ST ELEVATION: 0 MM
STRESS TARGET HR: 146 BPM

## 2021-05-29 NOTE — PROGRESS NOTES
Let her know her echocardiogram and nuclear stress test are normal.  Please the results of testing. Would like to see her back in 1 year.   Future Appointments  10/22/2021 8:30 AM    H1 GARRET RASCON              [facility]

## 2021-06-03 NOTE — PROGRESS NOTES
Attempted to reach patient by telephone x3. Unable to leave VM as mailbox is full. Attempted to reach patient's son by telephone. A message was left for return call.

## 2021-06-04 NOTE — PROGRESS NOTES
Two patient identifiers verified. Spoke to patient's son (on PHI) and went results and scheduled follow up. Patient verbalized understanding and denies any further questions or concerns at this time.

## 2021-06-14 ENCOUNTER — TELEPHONE (OUTPATIENT)
Dept: CARDIOLOGY CLINIC | Age: 76
End: 2021-06-14

## 2021-06-14 NOTE — PROGRESS NOTES
Addendum 6/14/2021   Notes reviewed from 4/5/2021 visit with PCP indicate patient is on lovastatin 10 mg daily with a hemoglobin A1c of 5.8% hemoglobin of 13 and on 10/6/2020 the cholesterol was 222 HDL 63 . Patient has normal nuclear stress test since then. She is on primary prevention and could consider a change to more potent statin such as simvastatin or Lipitor. She may have had issues with statins in the past.  Please inquire the patient is in why she is on lovastatin and would she consider a change to a more potent statin to try to bring the LDL at least below 100?

## 2021-06-14 NOTE — TELEPHONE ENCOUNTER
Notes reviewed from 4/5/2021 visit with PCP indicate patient is on lovastatin 10 mg daily with a hemoglobin A1c of 5.8% hemoglobin of 13 and on 10/6/2020 the cholesterol was 222 HDL 63 . Patient has normal nuclear stress test since then. She is on primary prevention and could consider a change to more potent statin such as simvastatin or Lipitor. She may have had issues with statins in the past.  Please inquire the patient is in why she is on lovastatin and would she consider a change to a more potent statin to try to bring the LDL at least below 100?

## 2021-06-15 NOTE — TELEPHONE ENCOUNTER
Verified patient with two types of identifiers. Spoke to patient who reports she has taken pitavastatin in the past and that worked well for her. She wants Dr. Yue Merida opinion on restarting that one as opposed to simvastatin or atrovastion.  Will notify MD.

## 2021-06-16 NOTE — TELEPHONE ENCOUNTER
Guidelines and our experience suggest that pitavastatin less likely to get to goal  The nice part is we always have a measure or metric by checking FLP   I prefer theother meds   Lipitor 10 mg but if she wants to try pitavastatin at usual dose and check FLP in 6 months ,we can if she gets to goal

## 2021-08-02 NOTE — TELEPHONE ENCOUNTER
Verified patient with two types of identifiers. Patient will have [address] with PCP at September follow up and let us know results. Patient verbalized understanding and will call with any other questions.

## 2021-10-15 DIAGNOSIS — M81.0 OSTEOPOROSIS WITHOUT CURRENT PATHOLOGICAL FRACTURE, UNSPECIFIED OSTEOPOROSIS TYPE: Primary | ICD-10-CM

## 2021-10-15 RX ORDER — DIPHENHYDRAMINE HYDROCHLORIDE 50 MG/ML
50 INJECTION, SOLUTION INTRAMUSCULAR; INTRAVENOUS AS NEEDED
Status: CANCELLED
Start: 2021-10-22

## 2021-10-15 RX ORDER — ALBUTEROL SULFATE 0.83 MG/ML
2.5 SOLUTION RESPIRATORY (INHALATION) AS NEEDED
Status: CANCELLED
Start: 2021-10-22

## 2021-10-15 RX ORDER — ACETAMINOPHEN 325 MG/1
650 TABLET ORAL AS NEEDED
Status: CANCELLED
Start: 2021-10-22

## 2021-10-15 RX ORDER — HYDROCORTISONE SODIUM SUCCINATE 100 MG/2ML
100 INJECTION, POWDER, FOR SOLUTION INTRAMUSCULAR; INTRAVENOUS AS NEEDED
Status: CANCELLED | OUTPATIENT
Start: 2021-10-22

## 2021-10-15 RX ORDER — DIPHENHYDRAMINE HYDROCHLORIDE 50 MG/ML
25 INJECTION, SOLUTION INTRAMUSCULAR; INTRAVENOUS AS NEEDED
Status: CANCELLED
Start: 2021-10-22

## 2021-10-15 RX ORDER — ONDANSETRON 2 MG/ML
8 INJECTION INTRAMUSCULAR; INTRAVENOUS AS NEEDED
Status: CANCELLED | OUTPATIENT
Start: 2021-10-22

## 2021-10-15 RX ORDER — EPINEPHRINE 1 MG/ML
0.3 INJECTION, SOLUTION, CONCENTRATE INTRAVENOUS AS NEEDED
Status: CANCELLED | OUTPATIENT
Start: 2021-10-22

## 2021-10-18 ENCOUNTER — TELEPHONE (OUTPATIENT)
Dept: RHEUMATOLOGY | Age: 76
End: 2021-10-18

## 2021-10-18 NOTE — TELEPHONE ENCOUNTER
----- Message from Sanchez Smith MD sent at 10/15/2021 10:20 AM EDT -----  She will need a renal panel before the prolia

## 2021-10-18 NOTE — TELEPHONE ENCOUNTER
Left voicemail to have the pt give our office a return call to have the renal panel done prior to the prolia injection per Dr Candelario Cervantes

## 2021-10-22 ENCOUNTER — HOSPITAL ENCOUNTER (OUTPATIENT)
Dept: INFUSION THERAPY | Age: 76
Discharge: HOME | End: 2021-10-22
Payer: MEDICARE

## 2021-10-22 VITALS
HEART RATE: 85 BPM | SYSTOLIC BLOOD PRESSURE: 128 MMHG | RESPIRATION RATE: 16 BRPM | DIASTOLIC BLOOD PRESSURE: 79 MMHG | TEMPERATURE: 97.5 F | OXYGEN SATURATION: 98 %

## 2021-10-22 DIAGNOSIS — M81.0 OSTEOPOROSIS WITHOUT CURRENT PATHOLOGICAL FRACTURE, UNSPECIFIED OSTEOPOROSIS TYPE: Primary | ICD-10-CM

## 2021-10-22 PROCEDURE — 96372 THER/PROPH/DIAG INJ SC/IM: CPT

## 2021-10-22 PROCEDURE — 74011250636 HC RX REV CODE- 250/636: Performed by: PEDIATRICS

## 2021-10-22 RX ORDER — ALBUTEROL SULFATE 0.83 MG/ML
2.5 SOLUTION RESPIRATORY (INHALATION) AS NEEDED
Status: CANCELLED
Start: 2022-04-22

## 2021-10-22 RX ORDER — ONDANSETRON 2 MG/ML
8 INJECTION INTRAMUSCULAR; INTRAVENOUS AS NEEDED
Status: CANCELLED | OUTPATIENT
Start: 2022-04-22

## 2021-10-22 RX ORDER — DIPHENHYDRAMINE HYDROCHLORIDE 50 MG/ML
25 INJECTION, SOLUTION INTRAMUSCULAR; INTRAVENOUS AS NEEDED
Status: CANCELLED
Start: 2022-04-22

## 2021-10-22 RX ORDER — DIPHENHYDRAMINE HYDROCHLORIDE 50 MG/ML
50 INJECTION, SOLUTION INTRAMUSCULAR; INTRAVENOUS AS NEEDED
Status: CANCELLED
Start: 2022-04-22

## 2021-10-22 RX ORDER — HYDROCORTISONE SODIUM SUCCINATE 100 MG/2ML
100 INJECTION, POWDER, FOR SOLUTION INTRAMUSCULAR; INTRAVENOUS AS NEEDED
Status: CANCELLED | OUTPATIENT
Start: 2022-04-22

## 2021-10-22 RX ORDER — ACETAMINOPHEN 325 MG/1
650 TABLET ORAL AS NEEDED
Status: CANCELLED
Start: 2022-04-22

## 2021-10-22 RX ORDER — EPINEPHRINE 1 MG/ML
0.3 INJECTION, SOLUTION, CONCENTRATE INTRAVENOUS AS NEEDED
Status: CANCELLED | OUTPATIENT
Start: 2022-04-22

## 2021-10-22 RX ADMIN — DENOSUMAB 60 MG: 60 INJECTION SUBCUTANEOUS at 08:33

## 2021-10-22 NOTE — PROGRESS NOTES
[facility] Progress Note    Date: October 22, 2021        0820: Pt arrived ambulatory to [facility] for Prolia in stable condition. Assessment completed. No new concerns voiced. Labs verified and scanned in system. Patient denies any symptoms of COVID-19, including SOB, coughing, fever, or generally not feeling well. Patient denies any recent exposure to COVID-19. Patient denies any recent contact with family or friends that have a pending COVID-19 test.    Criteria for treatment was met. Patient Vitals for the past 12 hrs:   Temp Pulse Resp BP SpO2   10/22/21 0819 97.5 °F (36.4 °C) 85 16 128/79 98 %       Medications Administered     denosumab (PROLIA) injection 60 mg     Admin Date  10/22/2021 Action  Given Dose  60 mg Route  SubCUTAneous Administered By  Chiquita Falcon RN            Given in Left Arm SQ        0840: Tolerated treatment well, no adverse reactions noted. D/Cd from [facility] ambulatory and in no distress. Patient is aware of next scheduled [facility] appointment.     Future Appointments   Date Time Provider Cameron Williamson   4/22/2022  8:30 AM H1 GARRET Karine S Adilene Grant   5/6/2022  9:00 AM MD SHAHEEN Lyle RN  October 22, 2021

## 2022-01-31 ENCOUNTER — OFFICE VISIT (OUTPATIENT)
Dept: NEUROLOGY | Age: 77
End: 2022-01-31
Payer: MEDICARE

## 2022-01-31 VITALS — SYSTOLIC BLOOD PRESSURE: 121 MMHG | RESPIRATION RATE: 20 BRPM | DIASTOLIC BLOOD PRESSURE: 82 MMHG

## 2022-01-31 DIAGNOSIS — R29.91 ABNORMAL FOOT FINDING: Primary | ICD-10-CM

## 2022-01-31 PROCEDURE — 1101F PT FALLS ASSESS-DOCD LE1/YR: CPT | Performed by: PSYCHIATRY & NEUROLOGY

## 2022-01-31 PROCEDURE — G8427 DOCREV CUR MEDS BY ELIG CLIN: HCPCS | Performed by: PSYCHIATRY & NEUROLOGY

## 2022-01-31 PROCEDURE — G8420 CALC BMI NORM PARAMETERS: HCPCS | Performed by: PSYCHIATRY & NEUROLOGY

## 2022-01-31 PROCEDURE — G8536 NO DOC ELDER MAL SCRN: HCPCS | Performed by: PSYCHIATRY & NEUROLOGY

## 2022-01-31 PROCEDURE — 3017F COLORECTAL CA SCREEN DOC REV: CPT | Performed by: PSYCHIATRY & NEUROLOGY

## 2022-01-31 PROCEDURE — 1090F PRES/ABSN URINE INCON ASSESS: CPT | Performed by: PSYCHIATRY & NEUROLOGY

## 2022-01-31 PROCEDURE — 99204 OFFICE O/P NEW MOD 45 MIN: CPT | Performed by: PSYCHIATRY & NEUROLOGY

## 2022-01-31 PROCEDURE — G8432 DEP SCR NOT DOC, RNG: HCPCS | Performed by: PSYCHIATRY & NEUROLOGY

## 2022-01-31 NOTE — PROGRESS NOTES
[address] [facility] and [address] at [facility] Neurology Clinics at [facility], [address] [facility] [address] [facility], [address] [place] [address]  [phone] Office  [phone]           Referring: Cata Lakhani MD  [facility]  [address] [facility],  [address] [facility]    No chief complaint on file. 60-year-old lady presents today for evaluation regarding question neuropathy. She tells me that she has not had any symptoms of numbness tingling burning or other dysesthesia in the feet or legs. Her balance has been fine. No focal weakness. Not dragging the foot. She had a nurse come out to her home and did a physical examination and checked her feet. She was uncertain as to why her feet were being checked as she had no complaint in that regard but she did have her feet looked at and she was told she needed to follow-up with her primary physician and then see a neurologist because there could be a problem. Mrs. Boothe does not have diabetes. She does note that she had a low B12 level at some point but she has been taking B12 supplementation and has not had any difficulty with that of late. No family history of neuropathy. Record review finds patient was seen by Dr. Mireya Darden back in 2018 for complaints of paresthesias around T4 region. She subsequently underwent MRI of the thoracic spine which showed a T7-T8 disc protrusion but no cord abnormality. She was also having some aching pain into her hamstrings and walking seem to help. Question of statin myopathy was raised and she went off statins and use some co-Q10 and laboratory analyses were checked. Labs were unremarkable in terms of CK at 71, aldolase 2.8 and TSH normal.  Subsequent laboratory analyses demonstrated a hemoglobin A1c of 6 as well as normal methylmalonic acid and normal immunoelectrophoresis.   Patient was informed she was prediabetic and she was going to control that with diet and exercise. Rheumatology visit with Dr. Lizzy Calvillo  15, 2020 where she was being seen for osteoporosis and was on Prolia. Other diagnosis was osteoarthritis and she was using some gel. Past Medical History:   Diagnosis Date    Family history of cardiac disorder 2/17/2016    Hypercholesteremia 2/17/2016    Menopause     LMP-unknown       Past Surgical History:   Procedure Laterality Date    HX HYSTERECTOMY  2/26/2014       Current Outpatient Medications   Medication Sig Dispense Refill    lovastatin (MEVACOR) 10 mg tablet Take 10 mg by mouth daily.  calcium carbonate (OS-MANJU) 500 mg calcium (1,250 mg) tablet Take  by mouth daily.  denosumab (PROLIA) 60 mg/mL injection 60 mg by SubCUTAneous route every 6 months.  cholecalciferol (VITAMIN D3) 50,000 unit capsule Take  by mouth every seven (7) days.  cyanocobalamin 1,000 mcg tablet Take 1,500 mcg by mouth daily. Allergies   Allergen Reactions    Codeine Other (comments)     dizziness       Social History     Tobacco Use    Smoking status: Never Smoker    Smokeless tobacco: Never Used   Substance Use Topics    Alcohol use: No    Drug use: No       Family History   Problem Relation Age of Onset    Heart Disease Mother     Heart Disease Father     Pacemaker Sister     Pacemaker Brother     Heart Disease Brother        Review of Systems  Pertinent positives and negatives as noted. Examination  There were no vitals taken for this visit. Neurologically, she is awake, alert, and oriented with normal speech and language. Her cognition is normal.    Intact cranial nerves 2-12. No nystagmus. Disk margins are flat bilaterally. She is status post cataract surgery left. She has normal bulk and tone. She has no abnormal movement. She has no pronation or drift. She generates full strength in the upper and lower extremities to direct confrontational testing.   Reflexes are symmetrical in the upper and lower extremities bilaterally. No pathologic reflexes are elicited. Finger nose finger and rapid alternating movements are normal.  Steady gait. Detailed sensory examination finds no abnormality to light touch, temperature, vibration or pinprick. Impression/Plan  45-year-old lady with concerns raised from a home health examination regarding question of peripheral neuropathy. No symptoms that would relate to such. No examination findings that would suggest peripheral neuropathy. Her examination is completely normal.  We discussed that we can see neuropathy in people who have had B12 deficiency but she is not having any complaints of such and her examination is not demonstrative of such. We discussed that she could undergo an EMG to evaluate for neuropathy but I would not put her through that as she is not symptomatic and her examination is normal.  We both agree this is the best course of action just a watchful approach and she was reassured that I did not find anything abnormal.    We would be happy to see this nice lady back again as needed    Jesse Liu MD          This note was created using voice recognition software. Despite editing, there may be syntax errors.

## 2022-03-18 PROBLEM — M81.0 OSTEOPOROSIS: Status: ACTIVE | Noted: 2021-10-15

## 2022-04-08 ENCOUNTER — TRANSCRIBE ORDER (OUTPATIENT)
Dept: SCHEDULING | Age: 77
End: 2022-04-08

## 2022-04-08 DIAGNOSIS — Z12.31 SCREENING MAMMOGRAM FOR HIGH-RISK PATIENT: Primary | ICD-10-CM

## 2022-04-22 ENCOUNTER — APPOINTMENT (OUTPATIENT)
Dept: INFUSION THERAPY | Age: 77
End: 2022-04-22
Payer: MEDICARE

## 2022-05-13 ENCOUNTER — APPOINTMENT (OUTPATIENT)
Dept: INFUSION THERAPY | Age: 77
End: 2022-05-13
Payer: MEDICARE

## 2022-05-20 ENCOUNTER — APPOINTMENT (OUTPATIENT)
Dept: INFUSION THERAPY | Age: 77
End: 2022-05-20
Payer: MEDICARE

## 2022-05-27 ENCOUNTER — HOSPITAL ENCOUNTER (OUTPATIENT)
Dept: MAMMOGRAPHY | Age: 77
Discharge: HOME | End: 2022-05-27
Attending: INTERNAL MEDICINE
Payer: MEDICARE

## 2022-05-27 DIAGNOSIS — Z12.31 SCREENING MAMMOGRAM FOR HIGH-RISK PATIENT: ICD-10-CM

## 2022-05-27 PROCEDURE — 77067 SCR MAMMO BI INCL CAD: CPT

## 2022-06-03 ENCOUNTER — HOSPITAL ENCOUNTER (OUTPATIENT)
Dept: INFUSION THERAPY | Age: 77
Discharge: HOME | End: 2022-06-03
Payer: MEDICARE

## 2022-06-03 VITALS
RESPIRATION RATE: 17 BRPM | DIASTOLIC BLOOD PRESSURE: 74 MMHG | TEMPERATURE: 97.7 F | SYSTOLIC BLOOD PRESSURE: 124 MMHG | HEART RATE: 77 BPM

## 2022-06-03 DIAGNOSIS — M81.0 OSTEOPOROSIS WITHOUT CURRENT PATHOLOGICAL FRACTURE, UNSPECIFIED OSTEOPOROSIS TYPE: Primary | ICD-10-CM

## 2022-06-03 LAB
ALBUMIN SERPL-MCNC: 3.6 G/DL (ref 3.5–5)
ALBUMIN/GLOB SERPL: 1 {RATIO} (ref 1.1–2.2)
ALP SERPL-CCNC: 45 U/L (ref 45–117)
ALT SERPL-CCNC: 12 U/L (ref 12–78)
ANION GAP SERPL CALC-SCNC: 5 MMOL/L (ref 5–15)
AST SERPL-CCNC: 14 U/L (ref 15–37)
BILIRUB SERPL-MCNC: 0.4 MG/DL (ref 0.2–1)
BUN SERPL-MCNC: 16 MG/DL (ref 6–20)
BUN/CREAT SERPL: 20 (ref 12–20)
CALCIUM SERPL-MCNC: 9.4 MG/DL (ref 8.5–10.1)
CHLORIDE SERPL-SCNC: 108 MMOL/L (ref 97–108)
CO2 SERPL-SCNC: 27 MMOL/L (ref 21–32)
CREAT SERPL-MCNC: 0.79 MG/DL (ref 0.55–1.02)
GLOBULIN SER CALC-MCNC: 3.7 G/DL (ref 2–4)
GLUCOSE SERPL-MCNC: 84 MG/DL (ref 65–100)
POTASSIUM SERPL-SCNC: 3.8 MMOL/L (ref 3.5–5.1)
PROT SERPL-MCNC: 7.3 G/DL (ref 6.4–8.2)
SODIUM SERPL-SCNC: 140 MMOL/L (ref 136–145)

## 2022-06-03 PROCEDURE — 74011250636 HC RX REV CODE- 250/636: Performed by: PEDIATRICS

## 2022-06-03 PROCEDURE — 36415 COLL VENOUS BLD VENIPUNCTURE: CPT

## 2022-06-03 PROCEDURE — 96372 THER/PROPH/DIAG INJ SC/IM: CPT

## 2022-06-03 PROCEDURE — 80053 COMPREHEN METABOLIC PANEL: CPT

## 2022-06-03 RX ORDER — DIPHENHYDRAMINE HYDROCHLORIDE 50 MG/ML
25 INJECTION, SOLUTION INTRAMUSCULAR; INTRAVENOUS AS NEEDED
Status: CANCELLED
Start: 2022-10-16

## 2022-06-03 RX ORDER — DIPHENHYDRAMINE HYDROCHLORIDE 50 MG/ML
50 INJECTION, SOLUTION INTRAMUSCULAR; INTRAVENOUS AS NEEDED
Status: CANCELLED
Start: 2022-10-16

## 2022-06-03 RX ORDER — ALBUTEROL SULFATE 0.83 MG/ML
2.5 SOLUTION RESPIRATORY (INHALATION) AS NEEDED
Status: CANCELLED
Start: 2022-10-16

## 2022-06-03 RX ORDER — EPINEPHRINE 1 MG/ML
0.3 INJECTION, SOLUTION, CONCENTRATE INTRAVENOUS AS NEEDED
Status: CANCELLED | OUTPATIENT
Start: 2022-10-16

## 2022-06-03 RX ORDER — ACETAMINOPHEN 325 MG/1
650 TABLET ORAL AS NEEDED
Status: CANCELLED
Start: 2022-10-16

## 2022-06-03 RX ORDER — HYDROCORTISONE SODIUM SUCCINATE 100 MG/2ML
100 INJECTION, POWDER, FOR SOLUTION INTRAMUSCULAR; INTRAVENOUS AS NEEDED
Status: CANCELLED | OUTPATIENT
Start: 2022-10-16

## 2022-06-03 RX ORDER — ONDANSETRON 2 MG/ML
8 INJECTION INTRAMUSCULAR; INTRAVENOUS AS NEEDED
Status: CANCELLED | OUTPATIENT
Start: 2022-10-16

## 2022-06-03 RX ADMIN — DENOSUMAB 60 MG: 60 INJECTION SUBCUTANEOUS at 09:58

## 2022-06-03 NOTE — PROGRESS NOTES
Outpatient Infusion Center Progress Note    [id number] Pt admit to [facility] for Prolia injection and lab draw ambulatory in stable condition. Assessment completed. No new concerns voiced. Peripheral labs drawn from right [facility] and sent for processing, Patient denies Covid contact. Ca resulted at 9.4. Injection given in right arm SC  Visit Vitals  /74 (BP 1 Location: Left arm, BP Patient Position: Sitting)   Pulse 77   Temp 97.7 °F (36.5 °C)   Resp 17   Breastfeeding No       Medications:  Medications Administered     denosumab (PROLIA) injection 60 mg     Admin Date  06/03/2022 Action  Given Dose  60 mg Route  SubCUTAneous Administered By  Kerry Restrepo, RN                1000 Pt tolerated treatment well. D/c home ambulatory in no distress. Pt aware of next appointment scheduled for 11/11/22. Recent Results (from the past 12 hour(s))   METABOLIC PANEL, COMPREHENSIVE    Collection Time: 06/03/22  8:54 AM   Result Value Ref Range    Sodium 140 136 - 145 mmol/L    Potassium 3.8 3.5 - 5.1 mmol/L    Chloride 108 97 - 108 mmol/L    CO2 27 21 - 32 mmol/L    Anion gap 5 5 - 15 mmol/L    Glucose 84 65 - 100 mg/dL    BUN 16 6 - 20 MG/DL    Creatinine 0.79 0.55 - 1.02 MG/DL    BUN/Creatinine ratio 20 12 - 20      GFR est AA >60 >60 ml/min/1.73m2    GFR est non-AA >60 >60 ml/min/1.73m2    Calcium 9.4 8.5 - 10.1 MG/DL    Bilirubin, total 0.4 0.2 - 1.0 MG/DL    ALT (SGPT) 12 12 - 78 U/L    AST (SGOT) 14 (L) 15 - 37 U/L    Alk.  phosphatase 45 45 - 117 U/L    Protein, total 7.3 6.4 - 8.2 g/dL    Albumin 3.6 3.5 - 5.0 g/dL    Globulin 3.7 2.0 - 4.0 g/dL    A-G Ratio 1.0 (L) 1.1 - 2.2

## 2022-07-06 ENCOUNTER — OFFICE VISIT (OUTPATIENT)
Dept: CARDIOLOGY CLINIC | Age: 77
End: 2022-07-06
Payer: MEDICARE

## 2022-07-06 VITALS
RESPIRATION RATE: 14 BRPM | DIASTOLIC BLOOD PRESSURE: 80 MMHG | SYSTOLIC BLOOD PRESSURE: 110 MMHG | HEART RATE: 84 BPM | HEIGHT: 61 IN | BODY MASS INDEX: 23.86 KG/M2 | WEIGHT: 126.4 LBS | OXYGEN SATURATION: 97 %

## 2022-07-06 DIAGNOSIS — Z82.49 FAMILY HISTORY OF CARDIAC DISORDER: ICD-10-CM

## 2022-07-06 DIAGNOSIS — E78.00 HYPERCHOLESTEREMIA: Primary | ICD-10-CM

## 2022-07-06 DIAGNOSIS — R00.2 PALPITATION: ICD-10-CM

## 2022-07-06 DIAGNOSIS — R06.02 SOB (SHORTNESS OF BREATH): ICD-10-CM

## 2022-07-06 PROCEDURE — 1090F PRES/ABSN URINE INCON ASSESS: CPT | Performed by: SPECIALIST

## 2022-07-06 PROCEDURE — G8536 NO DOC ELDER MAL SCRN: HCPCS | Performed by: SPECIALIST

## 2022-07-06 PROCEDURE — G8510 SCR DEP NEG, NO PLAN REQD: HCPCS | Performed by: SPECIALIST

## 2022-07-06 PROCEDURE — G8427 DOCREV CUR MEDS BY ELIG CLIN: HCPCS | Performed by: SPECIALIST

## 2022-07-06 PROCEDURE — 93000 ELECTROCARDIOGRAM COMPLETE: CPT | Performed by: SPECIALIST

## 2022-07-06 PROCEDURE — G8420 CALC BMI NORM PARAMETERS: HCPCS | Performed by: SPECIALIST

## 2022-07-06 PROCEDURE — 99214 OFFICE O/P EST MOD 30 MIN: CPT | Performed by: SPECIALIST

## 2022-07-06 PROCEDURE — 3017F COLORECTAL CA SCREEN DOC REV: CPT | Performed by: SPECIALIST

## 2022-07-06 PROCEDURE — 1123F ACP DISCUSS/DSCN MKR DOCD: CPT | Performed by: SPECIALIST

## 2022-07-06 PROCEDURE — 1101F PT FALLS ASSESS-DOCD LE1/YR: CPT | Performed by: SPECIALIST

## 2022-07-06 RX ORDER — ATORVASTATIN CALCIUM 10 MG/1
40 TABLET, FILM COATED ORAL DAILY
Qty: 30 TABLET | Refills: 1 | Status: SHIPPED | OUTPATIENT
Start: 2022-07-06 | End: 2022-07-15

## 2022-07-06 NOTE — PATIENT INSTRUCTIONS
I think that a different statin may be a little more helpful to reduce your long-term risk of stroke and heart attack. We would like to get the LDL bad cholesterol below 100,  ideally around 75. Instead of taking Mevacor I would like you to try Lipitor at 10 mg. If you notice a lot of side effects you can always switch back to the Mevacor. It is probably will take up to 20 mg to really get you at the goal of that LDL , but it is a good start just to make sure you can handle the transition. Once you have been stable on the same dose of Lipitor for about 3 to 4 months we will check another cholesterol. I will see you back in a year but will be talking to you in the meantime with the blood work.

## 2022-07-06 NOTE — Clinical Note
7/6/2022    Patient: Rock Jackson   YOB: 1945   Date of Visit: 7/6/2022     Arcadio Snow MD  [address] [place] [address]  Via Fax: [phone]    Dear Arcadio Snow MD,      Thank you for referring Ms. Rio Boothe to [facility] for evaluation. My notes for this consultation are attached. If you have questions, please do not hesitate to call me. I look forward to following your patient along with you.       Sincerely,    Sofia Becker MD

## 2022-07-06 NOTE — PROGRESS NOTES
Rio lT     1945       Lin Kaiser MD, [facility]  Date of Visit-7/6/2022   PCP is Rosana Marr MD   [facility] [place]  [facility]  HPI:  Rosanna Perdomo is a 77 y.o. female   Follow-up visit  Shortness of breath,exertional  Echo 2016 and routine treadmill normal at 6 minutes  Prior EKG with LVH  [facility] 5/8/2021- EF 84% late R wave progression no ischemia  Echo 5/28/2021 EF 55% normal  XOL patient has been on lovastatin 10 mg previously with an  and then 110, we suggested a change to a higher potency statin for primary prevention based on these numbers. She wanted to wait and see but I do not see a lipid done yet  Sees Rheum for osteoporosis and sees Neuro intermittently-notes reviewed     Today she returns with no specific complaints. She has a shortness of breath has been persistent but not as obvious and mainly only when she has a upper respiratory infection. She denies any edema chest pain chest pressure shortness of breath at rest.  She is had no palpitations or tachycardia. She says she used to go to the gym and exercise but now she is busy and not going to the gym anymore because the pandemic. She currently is taking care of grandkids which she feels keep her active. She saw her primary care and had blood work done and has been maintained on her Mevacor but does not recall the cholesterol values and does not have a copy with her today. EKG-normal sinus rhythm at 83 left axis deviation left ventricular hypertrophy by voltage criteria unchanged from prior,  no evidence for MI  Assessment/Plan:     1 .  Hypercholesteremia  On Mevacor, check FLP done with PCP previously LDL was not below 100 and I think even this is primary prevention given her family history I would recommend we change to Lipitor at 10 mg.  I think it probably can take more than that to get her to goal but it is reasonable to start there and just make sure she does not have side effects. Once she has been on this for about 3 months I like her to contact us back in the week and check on the lipid profile. Key Antihyperlipidemia Meds             lovastatin (MEVACOR) 10 mg tablet (Taking) Take 10 mg by mouth daily. No results found for: LDL, LDLC, DLDLP    2. SOB (shortness of breath)  Nonspecific dyspnea. Overall now improved  Normal echo and stress test    3. Family history of cardiac disorder  Her sister in [facility] has heart disease now. She is on no BP meds and BP is normal today. 4. Palpitation  Now asymptomatic ,stable    Patient Instructions   I think that t a different statin may be a little more helpful to reduce your long-term risk of stroke and heart attack. We would  like to get the LDL bad cholesterol below 100,  ideally around 75. Instead of taking Mevacor I would like you to try Lipitor at 10 mg. If you notice a lot of side effects you can always switch back to the Mevacor. It is probably will take up to 20 mg to really get you at the goal of that LDL , but it is a good start just to make sure you can handle the transition. Once you have been stable on the same dose of Lipitor for about 3 to 4 months we will check another cholesterol. I will see you back in a year but will be talking to you in the meantime with the blood work. Impression:   1. Hypercholesteremia    2. SOB (shortness of breath)    3. Family history of cardiac disorder    4. Palpitation       Cardiac History:    March 2011 with nonexertional chest discomfort, felt to be atypical pain. Stress nuclear in 2011 Stress test 3/2/11, walked 6 minutes and 41 seconds, normal perfusion. Dyslipidemia:   Echo Sab Feb 2016 WNL, ETT WNL     ROS:Cardiac as above. Respiratory as above with no wheezing or hemoptysis.    She denies  symptoms of unusual weight loss , fevers, night sweats, BRBPR, hematuria, seizure or recent stroke  see supplement sheet, initialed and to be scanned by staff  Past Medical History:   Diagnosis Date    Family history of cardiac disorder 2016    Hypercholesteremia 2016    Menopause     LMP-unknown      Social Hx= reports that she has never smoked. She has never used smokeless tobacco. She reports that she does not drink alcohol and does not use drugs. 0 alcohol drinks a week 1 cup coffee dialy  3 kids    family history includes Heart Disease in her brother, father, and mother; Pacemaker in her brother and sister. mother  78 father  72, 3 sibs with CAD  Allergies   Allergen Reactions    Codeine Other (comments)     dizziness      Exam and Labs:  /80 (BP 1 Location: Left arm, BP Patient Position: Sitting, BP Cuff Size: Small adult)   Pulse 84   Resp 14   Ht 5' 1\" (1.549 m)   Wt 126 lb 6.4 oz (57.3 kg)   SpO2 97%   BMI 23.88 kg/m² Constitutional:  NAD, comfortable  Head: NC,AT. Eyes: No scleral icterus. Neck:  Neck supple. No JVD present. Throat: moist mucous membranes. Chest: Effort normal & normal respiratory excursion . Neurological: alert, conversant and oriented . Skin: Skin is not cold. No obvious systemic rash noted. Not diaphoretic. No erythema. Psychiatric:  Grossly normal mood and affect. Behavior appears normal. Extremities:  no clubbing or cyanosis. Abdomen: non distended    Lungs:breath sounds normal. No stridor. distress, wheezes or  Rales. Heart: normal rate, regular rhythm, normal S1, S2, no murmurs, rubs, clicks or gallops , PMI non displaced. Edema: Edema is none.   No results found for: CHOL, CHOLX, CHLST, CHOLV, HDL, HDLP, LDL, LDLC, DLDLP, TGLX, TRIGL, TRIGP, CHHD, CHHDX  Lab Results   Component Value Date/Time    Sodium 140 2022 08:54 AM    Potassium 3.8 2022 08:54 AM    Chloride 108 2022 08:54 AM    CO2 27 2022 08:54 AM    Anion gap 5 2022 08:54 AM    Glucose 84 2022 08:54 AM    BUN 16 2022 08:54 AM    Creatinine 0.79 2022 08:54 AM    BUN/Creatinine ratio 20 2022 08:54 AM    GFR est AA >60 06/03/2022 08:54 AM    GFR est non-AA >60 06/03/2022 08:54 AM    Calcium 9.4 06/03/2022 08:54 AM      Wt Readings from Last 3 Encounters:   07/06/22 126 lb 6.4 oz (57.3 kg)   05/28/21 131 lb (59.4 kg)   05/28/21 131 lb (59.4 kg)      BP Readings from Last 3 Encounters:   07/06/22 110/80   06/03/22 124/74   01/31/22 121/82      Current Outpatient Medications   Medication Sig    lovastatin (MEVACOR) 10 mg tablet Take 10 mg by mouth daily.  calcium carbonate (OS-MANJU) 500 mg calcium (1,250 mg) tablet Take  by mouth daily.  denosumab (PROLIA) 60 mg/mL injection 60 mg by SubCUTAneous route every 6 months.  cholecalciferol (VITAMIN D3) 50,000 unit capsule Take  by mouth every seven (7) days.  cyanocobalamin 1,000 mcg tablet Take 1,500 mcg by mouth daily. No current facility-administered medications for this visit. Impression see above.       Written by Darylene Bors, as dictated by Karyn Greenwood MD.

## 2022-07-15 RX ORDER — ATORVASTATIN CALCIUM 10 MG/1
10 TABLET, FILM COATED ORAL DAILY
Qty: 30 TABLET | Refills: 1 | Status: SHIPPED | OUTPATIENT
Start: 2022-07-15

## 2022-07-15 NOTE — TELEPHONE ENCOUNTER
Cardiologist: Dr. Kristin Euceda    Last appt: 7/6/2022  Future Appointments   Date Time Provider Cameron Williamson   11/11/2022  9:00 AM Ankur Jiménez   7/10/2023 10:00 AM Lin Kaiser MD CAVREY BS AMB       Requested Prescriptions     Signed Prescriptions Disp Refills    atorvastatin (LIPITOR) 10 mg tablet 30 Tablet 1     Sig: Take 1 Tablet by mouth daily.      Authorizing Provider: [place]     Ordering User: YUSRA BOWMAN         Refjames VO per Dr. Kristin Euceda.

## 2022-10-29 NOTE — PATIENT INSTRUCTIONS
A Healthy Lifestyle: Care Instructions  Your Care Instructions    A healthy lifestyle can help you feel good, stay at a healthy weight, and have plenty of energy for both work and play. A healthy lifestyle is something you can share with your whole family. A healthy lifestyle also can lower your risk for serious health problems, such as high blood pressure, heart disease, and diabetes. You can follow a few steps listed below to improve your health and the health of your family. Follow-up care is a key part of your treatment and safety. Be sure to make and go to all appointments, and call your doctor if you are having problems. It's also a good idea to know your test results and keep a list of the medicines you take. How can you care for yourself at home? · Do not eat too much sugar, fat, or fast foods. You can still have dessert and treats now and then. The goal is moderation. · Start small to improve your eating habits. Pay attention to portion sizes, drink less juice and soda pop, and eat more fruits and vegetables. ¨ Eat a healthy amount of food. A 3-ounce serving of meat, for example, is about the size of a deck of cards. Fill the rest of your plate with vegetables and whole grains. ¨ Limit the amount of soda and sports drinks you have every day. Drink more water when you are thirsty. ¨ Eat at least 5 servings of fruits and vegetables every day. It may seem like a lot, but it is not hard to reach this goal. A serving or helping is 1 piece of fruit, 1 cup of vegetables, or 2 cups of leafy, raw vegetables. Have an apple or some carrot sticks as an afternoon snack instead of a candy bar. Try to have fruits and/or vegetables at every meal.  · Make exercise part of your daily routine. You may want to start with simple activities, such as walking, bicycling, or slow swimming. Try to be active 30 to 60 minutes every day. You do not need to do all 30 to 60 minutes all at once.  For example, you can exercise Pt arrives by EMS for complaint of shortness of breath. Per EMS pt called for a lift assist but upon arrival seem lexus shortness of breath with audible wheezing.   3 times a day for 10 or 20 minutes. Moderate exercise is safe for most people, but it is always a good idea to talk to your doctor before starting an exercise program.  · Keep moving. [place] the lawn, work in the garden, or [employer]. Take the stairs instead of the elevator at work. · If you smoke, quit. People who smoke have an increased risk for heart attack, stroke, cancer, and other lung illnesses. Quitting is hard, but there are ways to boost your chance of quitting tobacco for good. ¨ Use nicotine gum, patches, or lozenges. ¨ Ask your doctor about stop-smoking programs and medicines. ¨ Keep trying. In addition to reducing your risk of diseases in the future, you will notice some benefits soon after you stop using tobacco. If you have shortness of breath or asthma symptoms, they will likely get better within a few weeks after you quit. · Limit how much alcohol you drink. Moderate amounts of alcohol (up to 2 drinks a day for men, 1 drink a day for women) are okay. But drinking too much can lead to liver problems, high blood pressure, and other health problems. Family health  If you have a family, there are many things you can do together to improve your health. · Eat meals together as a family as often as possible. · Eat healthy foods. This includes fruits, vegetables, lean meats and dairy, and whole grains. · Include your family in your fitness plan. Most people think of activities such as jogging or tennis as the way to fitness, but there are many ways you and your family can be more active. Anything that makes you breathe hard and gets your heart pumping is exercise. Here are some tips:  ¨ Walk to do errands or to take your child to school or the bus. ¨ Go for a family bike ride after dinner instead of watching TV. Where can you learn more? Go to http://rachael-tiny.info/. Enter D841 in the search box to learn more about \"A Healthy Lifestyle: Care Instructions. \"  Current as of: May 12, 2017  Content Version: 11.4  © 8094-4489 [employer]wise, Incorporated. Care instructions adapted under license by [employer] (which disclaims liability or warranty for this information). If you have questions about a medical condition or this instruction, always ask your healthcare professional. [address] any warranty or liability for your use of this information.

## 2022-11-11 ENCOUNTER — HOSPITAL ENCOUNTER (OUTPATIENT)
Dept: INFUSION THERAPY | Age: 77
End: 2022-11-11
Payer: MEDICARE

## 2022-11-17 ENCOUNTER — APPOINTMENT (OUTPATIENT)
Dept: INFUSION THERAPY | Age: 77
End: 2022-11-17
Payer: MEDICARE

## 2022-11-29 DIAGNOSIS — M81.0 OSTEOPOROSIS WITHOUT CURRENT PATHOLOGICAL FRACTURE, UNSPECIFIED OSTEOPOROSIS TYPE: Primary | ICD-10-CM

## 2022-11-29 RX ORDER — DIPHENHYDRAMINE HYDROCHLORIDE 50 MG/ML
25 INJECTION, SOLUTION INTRAMUSCULAR; INTRAVENOUS AS NEEDED
Start: 2022-12-02

## 2022-11-29 RX ORDER — HYDROCORTISONE SODIUM SUCCINATE 100 MG/2ML
100 INJECTION, POWDER, FOR SOLUTION INTRAMUSCULAR; INTRAVENOUS AS NEEDED
OUTPATIENT
Start: 2022-12-02

## 2022-11-29 RX ORDER — ONDANSETRON 2 MG/ML
8 INJECTION INTRAMUSCULAR; INTRAVENOUS AS NEEDED
OUTPATIENT
Start: 2022-12-02

## 2022-11-29 RX ORDER — ACETAMINOPHEN 325 MG/1
650 TABLET ORAL AS NEEDED
Start: 2022-12-02

## 2022-11-29 RX ORDER — ALBUTEROL SULFATE 0.83 MG/ML
2.5 SOLUTION RESPIRATORY (INHALATION) AS NEEDED
Start: 2022-12-02

## 2022-11-29 RX ORDER — EPINEPHRINE 1 MG/ML
0.3 INJECTION, SOLUTION, CONCENTRATE INTRAVENOUS AS NEEDED
OUTPATIENT
Start: 2022-12-02

## 2022-11-29 RX ORDER — DIPHENHYDRAMINE HYDROCHLORIDE 50 MG/ML
50 INJECTION, SOLUTION INTRAMUSCULAR; INTRAVENOUS AS NEEDED
Start: 2022-12-02

## 2022-12-02 ENCOUNTER — HOSPITAL ENCOUNTER (OUTPATIENT)
Dept: INFUSION THERAPY | Age: 77
End: 2022-12-02
Payer: MEDICARE

## 2022-12-09 ENCOUNTER — HOSPITAL ENCOUNTER (OUTPATIENT)
Dept: INFUSION THERAPY | Age: 77
Discharge: HOME | End: 2022-12-09
Payer: MEDICARE

## 2022-12-09 VITALS
HEART RATE: 79 BPM | TEMPERATURE: 97.5 F | RESPIRATION RATE: 16 BRPM | SYSTOLIC BLOOD PRESSURE: 131 MMHG | DIASTOLIC BLOOD PRESSURE: 74 MMHG

## 2022-12-09 DIAGNOSIS — M81.0 OSTEOPOROSIS WITHOUT CURRENT PATHOLOGICAL FRACTURE, UNSPECIFIED OSTEOPOROSIS TYPE: Primary | ICD-10-CM

## 2022-12-09 LAB
ALBUMIN SERPL-MCNC: 3.5 G/DL (ref 3.5–5)
ALBUMIN/GLOB SERPL: 0.9 {RATIO} (ref 1.1–2.2)
ALP SERPL-CCNC: 49 U/L (ref 45–117)
ALT SERPL-CCNC: 13 U/L (ref 12–78)
ANION GAP BLD CALC-SCNC: 11 MMOL/L (ref 10–20)
ANION GAP SERPL CALC-SCNC: 2 MMOL/L (ref 5–15)
AST SERPL-CCNC: 11 U/L (ref 15–37)
BILIRUB SERPL-MCNC: 0.4 MG/DL (ref 0.2–1)
BUN SERPL-MCNC: 16 MG/DL (ref 6–20)
BUN/CREAT SERPL: 20 (ref 12–20)
CA-I BLD-MCNC: 1.11 MMOL/L (ref 1.12–1.32)
CALCIUM SERPL-MCNC: 8.8 MG/DL (ref 8.5–10.1)
CHLORIDE BLD-SCNC: 104 MMOL/L (ref 98–107)
CHLORIDE SERPL-SCNC: 109 MMOL/L (ref 97–108)
CO2 BLD-SCNC: 25.8 MMOL/L (ref 21–32)
CO2 SERPL-SCNC: 27 MMOL/L (ref 21–32)
CREAT BLD-MCNC: 0.89 MG/DL (ref 0.6–1.3)
CREAT SERPL-MCNC: 0.81 MG/DL (ref 0.55–1.02)
GLOBULIN SER CALC-MCNC: 3.7 G/DL (ref 2–4)
GLUCOSE BLD-MCNC: 92 MG/DL (ref 65–100)
GLUCOSE SERPL-MCNC: 90 MG/DL (ref 65–100)
POTASSIUM BLD-SCNC: 3.8 MMOL/L (ref 3.5–5.1)
POTASSIUM SERPL-SCNC: 3.8 MMOL/L (ref 3.5–5.1)
PROT SERPL-MCNC: 7.2 G/DL (ref 6.4–8.2)
SERVICE CMNT-IMP: ABNORMAL
SODIUM BLD-SCNC: 140 MMOL/L (ref 136–145)
SODIUM SERPL-SCNC: 138 MMOL/L (ref 136–145)

## 2022-12-09 PROCEDURE — 80047 BASIC METABLC PNL IONIZED CA: CPT

## 2022-12-09 PROCEDURE — 96372 THER/PROPH/DIAG INJ SC/IM: CPT

## 2022-12-09 PROCEDURE — 80053 COMPREHEN METABOLIC PANEL: CPT

## 2022-12-09 PROCEDURE — 36415 COLL VENOUS BLD VENIPUNCTURE: CPT

## 2022-12-09 PROCEDURE — 74011250636 HC RX REV CODE- 250/636: Performed by: PEDIATRICS

## 2022-12-09 RX ORDER — ALBUTEROL SULFATE 0.83 MG/ML
2.5 SOLUTION RESPIRATORY (INHALATION) AS NEEDED
Start: 2023-06-04

## 2022-12-09 RX ORDER — DIPHENHYDRAMINE HYDROCHLORIDE 50 MG/ML
25 INJECTION, SOLUTION INTRAMUSCULAR; INTRAVENOUS AS NEEDED
Start: 2023-06-04

## 2022-12-09 RX ORDER — DIPHENHYDRAMINE HYDROCHLORIDE 50 MG/ML
25 INJECTION, SOLUTION INTRAMUSCULAR; INTRAVENOUS AS NEEDED
Status: ACTIVE | OUTPATIENT
Start: 2022-12-09 | End: 2022-12-09

## 2022-12-09 RX ORDER — ACETAMINOPHEN 325 MG/1
650 TABLET ORAL AS NEEDED
Status: ACTIVE | OUTPATIENT
Start: 2022-12-09 | End: 2022-12-09

## 2022-12-09 RX ORDER — ONDANSETRON 2 MG/ML
8 INJECTION INTRAMUSCULAR; INTRAVENOUS AS NEEDED
Status: ACTIVE | OUTPATIENT
Start: 2022-12-09 | End: 2022-12-09

## 2022-12-09 RX ORDER — ACETAMINOPHEN 325 MG/1
650 TABLET ORAL AS NEEDED
Start: 2023-06-04

## 2022-12-09 RX ORDER — DIPHENHYDRAMINE HYDROCHLORIDE 50 MG/ML
50 INJECTION, SOLUTION INTRAMUSCULAR; INTRAVENOUS AS NEEDED
Start: 2023-06-04

## 2022-12-09 RX ORDER — HYDROCORTISONE SODIUM SUCCINATE 100 MG/2ML
100 INJECTION, POWDER, FOR SOLUTION INTRAMUSCULAR; INTRAVENOUS AS NEEDED
OUTPATIENT
Start: 2023-06-04

## 2022-12-09 RX ORDER — ONDANSETRON 2 MG/ML
8 INJECTION INTRAMUSCULAR; INTRAVENOUS AS NEEDED
OUTPATIENT
Start: 2023-06-04

## 2022-12-09 RX ORDER — EPINEPHRINE 1 MG/ML
0.3 INJECTION, SOLUTION, CONCENTRATE INTRAVENOUS AS NEEDED
OUTPATIENT
Start: 2023-06-04

## 2022-12-09 RX ADMIN — DENOSUMAB 60 MG: 60 INJECTION SUBCUTANEOUS at 10:00

## 2022-12-09 NOTE — PROGRESS NOTES
[facility] Progress Note    Date: December 9, 2022        0800: Pt arrived ambulatory to [facility] for Prolia in stable condition. Assessment completed. Labs drawn peripherally from right [facility] and sent for processing. Labs reviewed. Criteria for treatment was met. Visit Vitals  /74   Pulse 79   Temp 97.5 °F (36.4 °C)   Resp 16   Breastfeeding No        Medications Administered       denosumab (PROLIA) injection 60 mg       Admin Date  12/09/2022 Action  Given Dose  60 mg Route  SubCUTAneous Administered By  Yennifer Black RN                    Injection given in left arm. Ms. Pooja Washington tolerated the treatment well, and had no complaints. Ms. Pooja Washington was discharged from [place] [address] in stable condition. Patient is aware of next scheduled [facility] appointment.     Future Appointments   Date Time Provider Cameron Williamson   12/27/2022  8:00 AM MD FLAKO Camejo BS AMB   7/10/2023 10:00 AM MD SHAHEEN Moran AMB           Euna Cogan, RN, RN  December 9, 2022

## 2022-12-27 ENCOUNTER — OFFICE VISIT (OUTPATIENT)
Dept: RHEUMATOLOGY | Age: 77
End: 2022-12-27
Payer: MEDICARE

## 2022-12-27 VITALS
HEART RATE: 84 BPM | SYSTOLIC BLOOD PRESSURE: 111 MMHG | RESPIRATION RATE: 16 BRPM | OXYGEN SATURATION: 98 % | DIASTOLIC BLOOD PRESSURE: 74 MMHG | WEIGHT: 124 LBS | TEMPERATURE: 97.8 F | BODY MASS INDEX: 23.43 KG/M2

## 2022-12-27 DIAGNOSIS — M81.0 POST-MENOPAUSAL OSTEOPOROSIS: Primary | ICD-10-CM

## 2022-12-27 PROCEDURE — G8510 SCR DEP NEG, NO PLAN REQD: HCPCS | Performed by: PEDIATRICS

## 2022-12-27 PROCEDURE — 1090F PRES/ABSN URINE INCON ASSESS: CPT | Performed by: PEDIATRICS

## 2022-12-27 PROCEDURE — 99214 OFFICE O/P EST MOD 30 MIN: CPT | Performed by: PEDIATRICS

## 2022-12-27 PROCEDURE — G8427 DOCREV CUR MEDS BY ELIG CLIN: HCPCS | Performed by: PEDIATRICS

## 2022-12-27 PROCEDURE — 1123F ACP DISCUSS/DSCN MKR DOCD: CPT | Performed by: PEDIATRICS

## 2022-12-27 PROCEDURE — G8420 CALC BMI NORM PARAMETERS: HCPCS | Performed by: PEDIATRICS

## 2022-12-27 PROCEDURE — G8536 NO DOC ELDER MAL SCRN: HCPCS | Performed by: PEDIATRICS

## 2022-12-27 PROCEDURE — 1101F PT FALLS ASSESS-DOCD LE1/YR: CPT | Performed by: PEDIATRICS

## 2022-12-27 PROCEDURE — 3017F COLORECTAL CA SCREEN DOC REV: CPT | Performed by: PEDIATRICS

## 2022-12-27 NOTE — PROGRESS NOTES
Chief Complaint   Patient presents with    Joint Pain     1. Have you been to the ER, urgent care clinic since your last visit? Hospitalized since your last visit? No    2. Have you seen or consulted any other health care providers outside of the [address] [place] [address] since your last visit? Include any pap smears or colon screening.  No

## 2022-12-27 NOTE — PROGRESS NOTES
RHEUMATOLOGY PROBLEM LIST AND CHIEF COMPLAINT  1. Osteoporosis - She took Fosamax from 2407-4063. Her bone density did not improve with Fosamax, and she switched to Prolia injections from 7767-1125. She stopped Prolia due to AEs of rash. She switched back to Fosamax, and experienced AEs of musculoskeletal pain. She then switched to Risedronate, but stopped this medication due to AEs. Current medication - prolia    Therapy History:  Current: Prolia (2018-current)     INTERVAL HISTORY  This is a 77 y.o.  female. Today, the patient complains of no pain in the joints. Location: none  Timing: none at this time   Duration:  7 months  Modifying factors:   Context/Associated signs and symptoms: The patient has been doing well. She continues on Prolia injections q6 months, vitamin d3 1000 iu daily, and calcium 500 mg once a week. She has not had any interval fractures. She notes a rash on her right forearm.      RHEUMATOLOGY REVIEW OF SYSTEMS   Positives as per HPI  Negatives as follows:  [place] Rudy:  Denies unexplained persistent fevers, weight change, chronic fatigue  HEAD/EYES:   Denies eye redness, blurry vision or sudden loss of vision, dry eyes, HA, temporal artery pain  ENT:    Denies oral/nasal ulcers, recurrent sinus infections, dry mouth  RESPIRATORY:  No pleuritic pain, history of pleural effusions, hemoptysis, exertional dyspnea  CARDIOVASCULAR:  Denies chest pain, history of pericardial effusions  GASTRO:   Denies heartburn, esophageal dysmotility, abdominal pain, nausea, vomiting, diarrhea, blood in the stool  HEMATOLOGIC:  No easy bruising, purpura, swollen lymph nodes  SKIN:    Denies alopecia, ulcers, nodules, sun sensitivity, unexplained persistent rash   VASCULAR:   Denies edema, cyanosis, raynaud phenomenon  NEUROLOGIC:  Denies specific muscle weakness, paresthesias   PSYCHIATRIC:  No sleep disturbance / snoring, depression, anxiety  MSK:    No morning stiffness >1 hour, SI joint pain, persistent joint swelling, persistent joint pain    PAST MEDICAL, SOCIAL AND FAMILY HISTORY  Reviewed with patient, significant changes in medical history - no      FAMILY HISTORY  lupus     PHYSICAL EXAM  Blood pressure 111/74, pulse 84, temperature 97.8 °F (36.6 °C), temperature source Oral, resp. rate 16, weight 124 lb (56.2 kg), SpO2 98 %. GENERAL APPEARANCE: Well-nourished, no acute distress  EYES: No scleral erythema, conjunctival injection  ENT: No oral ulcer, parotid enlargement  NECK: No adenopathy, thyroid enlargement  CARDIOVASCULAR: Heart rhythm is regular. No murmur, rub, gallop  CHEST: Normal vesicular breath sounds. No wheezes, rales, pleural friction rubs  ABDOMINAL: The abdomen is soft and nontender. Bowel sounds are normal  EXTREMITIES: There is no evidence of clubbing, cyanosis, edema  SKIN: No rash, palpable purpura, digital ulcer, abnormal thickening, normal nailfold capillaries. Ulcer noted on right upper extremity   NEUROLOGICAL: Normal gait and station, full strength in upper and lower extremities,  normal sensation to light touch  MUSCULOSKELETAL: OA changes noted, no synovitis  Upper extremities - full range of motion, no tenderness, no swelling, no synovial thickening and no deformity of joints  Lower extremities - full range of motion, no tenderness, no swelling, no synovial thickening and no deformity of joints     LABS, RADIOLOGY AND PROCEDURES  Previous labs reviewed -Yes  Previous radiology reviewed -Yes    10/2019 DEXA BONE DENSITY STUDY AXIAL  This patient is osteoporotic using the World Health Organization criteria  As compared to the prior study, there has been an increase in the bone mineral  density of the lumbar spine of 2.2% and of the right total hip of 1.1%, and a  decrease in the bone mineral density of the left distal third radius of 2.6%.   10 year probability of major osteoporotic fracture: 15.2%  10 year probability of hip fracture: 7.7%     4/2017 DEXA BONE DENSITY STUDY AXIAL   Impression: This patient is osteoporotic using the World Health Organization criteria  As compared to the prior study, there has been a significant 4.5% decrease in  the lumbar spine but no change in the hip. Please assess treatment compliance. 3/2015 DEXA BONE DENSITY STUDY AXIAL   IMPRESSION:  This patient is osteoporotic using the World Health Organization criteria  As compared to the prior study, there has been no significant change  10 year probability of major osteoporotic fracture:  7.8%  10 year probability of hip fracture:  1.7%    5/2013 DEXA BONE DENSITY STUDY AXIAL  IMPRESSION:  This patient is osteopenic using the World Health Organization criteria  As compared to the prior study, there has been 5.5% increase in the lumbar   spine and 0.1% increase in the total mean hip, compatible with response to   treatment  10 year probability of major osteoporotic fracture:  13.9%  10 year probability of hip fracture:  2.3%    2/2011 [facility] DXA BONE DENSITY STUDY AXIAL  IMPRESSION:  This patient is osteoporotic using the World Health Organization criteria  10 year probability of major osteoporotic fracture:  7.3%  10 year probability of hip fracture:  1.5%      Previous procedures reviewed -Yes  Previous medical records reviewed/summarized -Yes    ASSESSMENT  1. Osteoporosis - (Established problem - Progressive disease) - Patient is on Prolia q6 months. She has not had any fragility fractures. We discussed other medication options including Evenity if she has a fracture in the future. Advised patient to continue Prolia q6 months. Continue with calcium and vitamin D. I ordered a repeat DEXA scan. 2. Osteoarthritis (Established problem -  Stable disease) - We did not discuss this issue today. She can continue to use Diclofenac topical for any recurrent thumb pain. She should return in 1 year for a follow up. PLAN  1. Prolia q6 months  2. Calcium, Vitamin D  3. DEXA scan  4.  Return in 2 years    Bony Méndez MD  Adult and Pediatric Rheumatology     [facility], [address], Phone [phone], Fax [phone]    Visiting  of Pediatrics    Department of Pediatrics, [facility] of [address] [place] [address], [address] [place] [address], Phone [phone], Fax [phone]    There are no Patient Instructions on file for this visit. cc:  Donavan Blair MD    I, Brenda Lea MD, personally performed the services described in the documentation as scribed by Magdaleno Melgoza in my presence and have reviewed and agree with the note as scribed.

## 2023-01-04 ENCOUNTER — HOSPITAL ENCOUNTER (OUTPATIENT)
Dept: MAMMOGRAPHY | Age: 78
Discharge: HOME | End: 2023-01-04
Attending: PEDIATRICS
Payer: MEDICARE

## 2023-01-04 DIAGNOSIS — M81.0 POST-MENOPAUSAL OSTEOPOROSIS: ICD-10-CM

## 2023-01-04 PROCEDURE — 77080 DXA BONE DENSITY AXIAL: CPT

## 2023-05-17 ENCOUNTER — HOSPITAL ENCOUNTER (OUTPATIENT)
Facility: HOSPITAL | Age: 78
Setting detail: RECURRING SERIES
Discharge: HOME | End: 2023-05-20
Payer: OTHER GOVERNMENT

## 2023-05-17 PROCEDURE — 97161 PT EVAL LOW COMPLEX 20 MIN: CPT

## 2023-05-17 PROCEDURE — 97110 THERAPEUTIC EXERCISES: CPT

## 2023-05-17 PROCEDURE — 97140 MANUAL THERAPY 1/> REGIONS: CPT

## 2023-05-17 NOTE — THERAPY EVALUATION
[facility]   a part of [facility]  [address]  [place], [address]  CCIQZ:[phone] ORP:[phone]                                                                            PHYSICAL THERAPY - MEDICARE EVALUATION/PLAN OF CARE NOTE (updated 3/23)      Date: 2023          Patient Name:  Ana Edwards :  1945   Medical   Diagnosis:  Neck pain [M54.2] Treatment Diagnosis:  M54.2  NECK PAIN    Referral Source:  Yoon Pham MD Provider #:  [id number]                  Insurance: Payor:  EAST / Plan:  EAST / Product Type: *No Product type* /      Patient  verified yes     Visit #   Current  / Total 1 16   Time   In / Out 1:00 pm 2:05 pm   Total Treatment Time 65   Total Timed Codes 30   1:1 Treatment Time 30      [facility] Totals Reminder:  bill using total billable   min of TIMED therapeutic procedures and modalities. 8-22 min = 1 unit; 23-37 min = 2 units; 38-52 min = 3 units;  53-67 min = 4 units; 68-82 min = 5 units           SUBJECTIVE  Pain Level (0-10 scale): 7  []constant [x]intermittent []improving []worsening []no change since onset    Any medication changes, allergies to medications, adverse drug reactions, diagnosis change, or new procedure performed?: [x] No    [] Yes (see summary sheet for update)  Medications: Verified on Patient Summary List    Subjective functional status/changes:         Start of Care: 2023  Onset Date: 2 weeks ago  Current symptoms/Complaints: B neck and upper trap pain, increased pain when looks over her shoulder, extends her head to look up, and with prolonged sitting. Has limited ROM. Denies headaches and radicular symptoms. Has been taking ibuprofen which helps some. Mechanism of Injury: Cutting branches overhead manually with miles. PLOF: Able to look over her shoulder, sit prolonged periods without pain or stiffness.   Limitations to PLOF/Activity or Recreational

## 2023-05-19 ENCOUNTER — HOSPITAL ENCOUNTER (OUTPATIENT)
Facility: HOSPITAL | Age: 78
Setting detail: RECURRING SERIES
Discharge: HOME | End: 2023-05-22
Payer: OTHER GOVERNMENT

## 2023-05-19 PROCEDURE — 97140 MANUAL THERAPY 1/> REGIONS: CPT

## 2023-05-22 ENCOUNTER — HOSPITAL ENCOUNTER (OUTPATIENT)
Facility: HOSPITAL | Age: 78
Setting detail: RECURRING SERIES
Discharge: HOME | End: 2023-05-25
Payer: OTHER GOVERNMENT

## 2023-05-22 PROCEDURE — 97110 THERAPEUTIC EXERCISES: CPT

## 2023-05-22 PROCEDURE — 97140 MANUAL THERAPY 1/> REGIONS: CPT

## 2023-05-22 NOTE — PROGRESS NOTES
PHYSICAL THERAPY - MEDICARE DAILY TREATMENT NOTE (updated 3/23)      Date: 2023          Patient Name:  Meg Harris :  1945   Medical   Diagnosis:  Neck pain [M54.2] Treatment Diagnosis:  M54.2  NECK PAIN    Referral Source:  Juancho Russo MD Insurance:   Payor:  EAST / Plan:  EAST / Product Type: *No Product type* /                     Patient  verified yes     Visit #   Current  / Total 3 16   Time   In / Out 12:05 pm 1:00 pm   Total Treatment Time 55   Total Timed Codes 35   1:1 Treatment Time 25      [facility] Totals Reminder:  bill using total billable   min of TIMED therapeutic procedures and modalities. 8-22 min = 1 unit; 23-37 min = 2 units; 38-52 min = 3 units; 53-67 min = 4 units; 68-82 min = 5 units            SUBJECTIVE    Pain Level (0-10 scale): 3  Any medication changes, allergies to medications, adverse drug reactions, diagnosis change, or new procedure performed?: [x] No    [] Yes (see summary sheet for update)  Medications: Verified on Patient Summary List    Subjective functional status/changes:     \"It's better, but sore. \"    OBJECTIVE  Tenderness B cervical facet joints and cervical paraspinals, traps    Therapeutic Procedures: Tx Min Billable or 1:1 Min (if diff from Tx Min) Procedure, Rationale, Specifics   15 15 72710 Therapeutic Exercise (timed):  increase ROM, strength, coordination, balance, and proprioception to improve patient's ability to progress to PLOF and address remaining functional goals. (see flow sheet as applicable)     Details if applicable:     25 25 98477 Manual Therapy (timed):  decrease pain, increase ROM, and increase tissue extensibility to improve patient's ability to progress to PLOF and address remaining functional goals. The manual therapy interventions were performed at a separate and distinct time from the therapeutic activities interventions .  (see flow sheet as applicable)     Details if applicable:    R2/0 unilateral PA mobs

## 2023-05-25 ENCOUNTER — HOSPITAL ENCOUNTER (OUTPATIENT)
Facility: HOSPITAL | Age: 78
Setting detail: RECURRING SERIES
Discharge: HOME | End: 2023-05-28
Payer: OTHER GOVERNMENT

## 2023-05-25 PROCEDURE — 97110 THERAPEUTIC EXERCISES: CPT

## 2023-05-25 PROCEDURE — 97140 MANUAL THERAPY 1/> REGIONS: CPT

## 2023-05-30 ENCOUNTER — TELEPHONE (OUTPATIENT)
Age: 78
End: 2023-05-30

## 2023-05-30 ENCOUNTER — HOSPITAL ENCOUNTER (OUTPATIENT)
Facility: HOSPITAL | Age: 78
Setting detail: RECURRING SERIES
Discharge: HOME | End: 2023-06-02
Payer: OTHER GOVERNMENT

## 2023-05-30 PROCEDURE — 97140 MANUAL THERAPY 1/> REGIONS: CPT

## 2023-05-30 PROCEDURE — 97110 THERAPEUTIC EXERCISES: CPT

## 2023-05-30 NOTE — TELEPHONE ENCOUNTER
Pt called office wanting to know when she was supposed to have a follow up appointment with Dr. Akshat Sandoval. Informed pt that AVS stated to return in 2 years to see Dr. Akshat Sandoval, pt acknowledged and understood and stated will call in the future to schedule.

## 2023-05-30 NOTE — PROGRESS NOTES
PHYSICAL THERAPY - MEDICARE DAILY TREATMENT NOTE (updated 3/23)      Date: 2023          Patient Name:  Luis E Quarles :  1945   Medical   Diagnosis:  Neck pain [M54.2] Treatment Diagnosis:  M54.2  NECK PAIN    Referral Source:  Sherrell Zhao MD Insurance:   Payor:  EAST / Plan:  EAST / Product Type: *No Product type* /                     Patient  verified yes     Visit #   Current  / Total 5 16   Time   In / Out 9:25 am 10:20 am   Total Treatment Time 55   Total Timed Codes 40   1:1 Treatment Time 40      [facility] Totals Reminder:  bill using total billable   min of TIMED therapeutic procedures and modalities. 8-22 min = 1 unit; 23-37 min = 2 units; 38-52 min = 3 units; 53-67 min = 4 units; 68-82 min = 5 units            SUBJECTIVE    Pain Level (0-10 scale): 2  Any medication changes, allergies to medications, adverse drug reactions, diagnosis change, or new procedure performed?: [x] No    [] Yes (see summary sheet for update)  Medications: Verified on Patient Summary List    Subjective functional status/changes:     \"It's better. \"    OBJECTIVE  Tenderness B cervical facet joints and cervical paraspinals. AROM:  50% cx rotation B    Therapeutic Procedures: Tx Min Billable or 1:1 Min (if diff from Tx Min) Procedure, Rationale, Specifics   15 15 49677 Therapeutic Exercise (timed):  increase ROM, strength, coordination, balance, and proprioception to improve patient's ability to progress to PLOF and address remaining functional goals. (see flow sheet as applicable)     Details if applicable:     25 21 17973 Manual Therapy (timed):  decrease pain, increase ROM, and increase tissue extensibility to improve patient's ability to progress to PLOF and address remaining functional goals. The manual therapy interventions were performed at a separate and distinct time from the therapeutic activities interventions .  (see flow sheet as applicable)     Details if applicable:

## 2023-06-01 ENCOUNTER — HOSPITAL ENCOUNTER (OUTPATIENT)
Facility: HOSPITAL | Age: 78
Setting detail: RECURRING SERIES
Discharge: HOME | End: 2023-06-04
Payer: OTHER GOVERNMENT

## 2023-06-01 PROCEDURE — 97140 MANUAL THERAPY 1/> REGIONS: CPT

## 2023-06-01 PROCEDURE — 97110 THERAPEUTIC EXERCISES: CPT

## 2023-06-02 ENCOUNTER — APPOINTMENT (OUTPATIENT)
Facility: HOSPITAL | Age: 78
End: 2023-06-02
Payer: MEDICARE

## 2023-07-05 ENCOUNTER — TRANSCRIBE ORDERS (OUTPATIENT)
Facility: HOSPITAL | Age: 78
End: 2023-07-05

## 2023-07-05 DIAGNOSIS — Z12.31 VISIT FOR SCREENING MAMMOGRAM: Primary | ICD-10-CM

## 2023-07-19 ENCOUNTER — HOSPITAL ENCOUNTER (OUTPATIENT)
Facility: HOSPITAL | Age: 78
Discharge: HOME | End: 2023-07-22
Payer: MEDICARE

## 2023-07-19 DIAGNOSIS — Z12.31 VISIT FOR SCREENING MAMMOGRAM: ICD-10-CM

## 2023-07-19 PROCEDURE — 77067 SCR MAMMO BI INCL CAD: CPT

## 2023-07-20 ENCOUNTER — TRANSCRIBE ORDERS (OUTPATIENT)
Facility: HOSPITAL | Age: 78
End: 2023-07-20

## 2023-07-20 DIAGNOSIS — M54.2 CERVICALGIA: Primary | ICD-10-CM

## 2023-07-27 ENCOUNTER — HOSPITAL ENCOUNTER (OUTPATIENT)
Facility: HOSPITAL | Age: 78
Discharge: HOME | End: 2023-07-27
Attending: ORTHOPAEDIC SURGERY
Payer: MEDICARE

## 2023-07-27 DIAGNOSIS — M54.2 CERVICALGIA: ICD-10-CM

## 2023-07-27 PROCEDURE — 72141 MRI NECK SPINE W/O DYE: CPT

## 2023-08-22 ENCOUNTER — TELEPHONE (OUTPATIENT)
Age: 78
End: 2023-08-22

## 2023-08-22 NOTE — TELEPHONE ENCOUNTER
Patient schedule for appt. With Dr. Fabien Olea on Monday 8/28/23 at [address] [place] [address]. Phone number and address given. Patient stated understanding. Discussed coming in 10-15 minutes early for the check-in process.

## 2023-08-28 ENCOUNTER — OFFICE VISIT (OUTPATIENT)
Age: 78
End: 2023-08-28
Payer: MEDICARE

## 2023-08-28 VITALS
SYSTOLIC BLOOD PRESSURE: 117 MMHG | HEART RATE: 85 BPM | DIASTOLIC BLOOD PRESSURE: 77 MMHG | RESPIRATION RATE: 20 BRPM | OXYGEN SATURATION: 95 %

## 2023-08-28 DIAGNOSIS — R51.9 NEW ONSET OF HEADACHES AFTER AGE 50: Primary | ICD-10-CM

## 2023-08-28 DIAGNOSIS — R51.9 NEW ONSET OF HEADACHES AFTER AGE 50: ICD-10-CM

## 2023-08-28 LAB — ERYTHROCYTE [SEDIMENTATION RATE] IN BLOOD: 27 MM/HR (ref 0–30)

## 2023-08-28 PROCEDURE — 99214 OFFICE O/P EST MOD 30 MIN: CPT | Performed by: PSYCHIATRY & NEUROLOGY

## 2023-08-28 PROCEDURE — 1123F ACP DISCUSS/DSCN MKR DOCD: CPT | Performed by: PSYCHIATRY & NEUROLOGY

## 2023-08-28 ASSESSMENT — PATIENT HEALTH QUESTIONNAIRE - PHQ9
SUM OF ALL RESPONSES TO PHQ QUESTIONS 1-9: 0
1. LITTLE INTEREST OR PLEASURE IN DOING THINGS: 0
SUM OF ALL RESPONSES TO PHQ9 QUESTIONS 1 & 2: 0
SUM OF ALL RESPONSES TO PHQ QUESTIONS 1-9: 0
2. FEELING DOWN, DEPRESSED OR HOPELESS: 0

## 2023-08-28 NOTE — PROGRESS NOTES
[facility] and [address] at [facility] at [address] [place] [address], [address] [place] [address]   [phone]              Chief Complaint   Patient presents with    Follow-up     Patient is here for headaches. She reports that she gets between 3-5 headaches a week. Current Outpatient Medications   Medication Sig Dispense Refill    atorvastatin (LIPITOR) 10 MG tablet Take 1 tablet by mouth daily      calcium carbonate (OYSTER SHELL CALCIUM 500 MG) 1250 (500 Ca) MG tablet Take by mouth daily      vitamin D (CHOLECALCIFEROL) 32146 UNIT CAPS Take by mouth every 7 days      cyanocobalamin 1000 MCG tablet Take 1.5 tablets by mouth daily      denosumab (PROLIA) 60 MG/ML SOSY SC injection Inject 1 mL into the skin       No current facility-administered medications for this visit. Allergies   Allergen Reactions    Codeine Other (See Comments)     Dizziness, rash     Social History     Tobacco Use    Smoking status: Never    Smokeless tobacco: Never   Substance Use Topics    Alcohol use: No    Drug use: No   71-year-old lady who presents today for new onset headache. 2 or 3 months ago she started to get these headaches in the vertex. Described as sharp and shooting. They do not happen every day but probably 3-5 times per week. They typically last seconds to minutes. She sometimes takes Motrin. No focal deficits. She saw her primary physician. She also saw vascular. She additionally saw orthopedics. She had MRI of her cervical spine    MRI of the cervical spine performed January 31, 2023 with multilevel degenerative change  Examination  /77 (Site: Left Upper Arm, Position: Sitting, Cuff Size: Medium Adult)   Pulse 85   Resp 20   SpO2 95%   Very pleasant lady. She is awake alert and oriented.   Her speech language and cognition are normal.  Cranial nerves are

## 2023-08-29 DIAGNOSIS — E78.00 HYPERCHOLESTEREMIA: Primary | ICD-10-CM

## 2023-08-29 RX ORDER — ATORVASTATIN CALCIUM 10 MG/1
10 TABLET, FILM COATED ORAL DAILY
Qty: 90 TABLET | Refills: 3 | Status: SHIPPED | OUTPATIENT
Start: 2023-08-29

## 2023-08-29 NOTE — TELEPHONE ENCOUNTER
Per VO by MD.    Future Appointments   Date Time Provider [address]   9/1/2023  7:00 AM [facility] MRI 2 [facility]   11/15/2023  3:40 PM MD MARS Echols BS AMB

## 2023-08-30 ENCOUNTER — TELEPHONE (OUTPATIENT)
Age: 78
End: 2023-08-30

## 2023-08-30 NOTE — TELEPHONE ENCOUNTER
----- Message from Rneae Carrasco MD sent at 8/29/2023  2:25 PM EDT -----  Pls tell patient ESR normal    ----- Message -----  From: Danielle Oviedo Incoming [place] [facility]  Sent: 8/29/2023  12:43 PM EDT  To: Renae Carrasco MD

## 2023-09-01 ENCOUNTER — TELEPHONE (OUTPATIENT)
Age: 78
End: 2023-09-01

## 2023-09-01 ENCOUNTER — HOSPITAL ENCOUNTER (OUTPATIENT)
Facility: HOSPITAL | Age: 78
End: 2023-09-01
Attending: PSYCHIATRY & NEUROLOGY
Payer: MEDICARE

## 2023-09-01 DIAGNOSIS — R51.9 NEW ONSET OF HEADACHES AFTER AGE 50: ICD-10-CM

## 2023-09-01 PROCEDURE — 70551 MRI BRAIN STEM W/O DYE: CPT

## 2023-09-27 ENCOUNTER — TELEPHONE (OUTPATIENT)
Age: 78
End: 2023-09-27

## 2023-09-27 NOTE — TELEPHONE ENCOUNTER
Patient is calling she wanted to know if she is due for her Prolia shot and does she need to see  to have this done.  Patient can be reached at [phone]

## 2023-09-28 ENCOUNTER — TELEPHONE (OUTPATIENT)
Age: 78
End: 2023-09-28

## 2023-09-28 NOTE — TELEPHONE ENCOUNTER
Left voicemail to have the patient give the office a return call regarding scheduling her prolia injection

## 2023-09-28 NOTE — TELEPHONE ENCOUNTER
Pt called and stated that she had received a a phone call from our office and I informed her that the nurse had stated that she did not have to come in and see the doctor, that she could just go a head and schedule her appt for her prolia shot wherever she usually goes and gets it, as per the doctor.  9/28/23

## 2023-10-12 ENCOUNTER — TELEPHONE (OUTPATIENT)
Age: 78
End: 2023-10-12

## 2023-10-12 NOTE — TELEPHONE ENCOUNTER
PT called and left a VM requesting a call back, I called the PT and she stated that a order is needed for Prolia to be done at [facility].  PT requested a call back from nurse or Saira Dash and I  informed her that he wasn't in today and she stated she understood

## 2023-10-13 DIAGNOSIS — M81.0 AGE-RELATED OSTEOPOROSIS WITHOUT CURRENT PATHOLOGICAL FRACTURE: Primary | ICD-10-CM

## 2023-10-13 RX ORDER — SODIUM CHLORIDE 9 MG/ML
INJECTION, SOLUTION INTRAVENOUS CONTINUOUS
Status: CANCELLED | OUTPATIENT
Start: 2023-10-13

## 2023-10-13 RX ORDER — DIPHENHYDRAMINE HYDROCHLORIDE 50 MG/ML
50 INJECTION INTRAMUSCULAR; INTRAVENOUS
Status: CANCELLED | OUTPATIENT
Start: 2023-10-13

## 2023-10-13 RX ORDER — ACETAMINOPHEN 325 MG/1
650 TABLET ORAL
Status: CANCELLED | OUTPATIENT
Start: 2023-10-13

## 2023-10-13 RX ORDER — ONDANSETRON 2 MG/ML
8 INJECTION INTRAMUSCULAR; INTRAVENOUS
Status: CANCELLED | OUTPATIENT
Start: 2023-10-13

## 2023-10-13 RX ORDER — EPINEPHRINE 1 MG/ML
0.3 INJECTION, SOLUTION, CONCENTRATE INTRAVENOUS PRN
Status: CANCELLED | OUTPATIENT
Start: 2023-10-13

## 2023-10-13 RX ORDER — ALBUTEROL SULFATE 90 UG/1
4 AEROSOL, METERED RESPIRATORY (INHALATION) PRN
Status: CANCELLED | OUTPATIENT
Start: 2023-10-13

## 2023-10-18 DIAGNOSIS — M81.0 AGE-RELATED OSTEOPOROSIS WITHOUT CURRENT PATHOLOGICAL FRACTURE: Primary | ICD-10-CM

## 2023-10-20 LAB — HBA1C MFR BLD HPLC: 6.1 %

## 2023-11-10 ENCOUNTER — HOSPITAL ENCOUNTER (OUTPATIENT)
Facility: HOSPITAL | Age: 78
Setting detail: INFUSION SERIES
Discharge: HOME | End: 2023-11-10
Payer: MEDICARE

## 2023-11-10 VITALS
RESPIRATION RATE: 16 BRPM | HEART RATE: 83 BPM | SYSTOLIC BLOOD PRESSURE: 117 MMHG | TEMPERATURE: 97.9 F | DIASTOLIC BLOOD PRESSURE: 78 MMHG

## 2023-11-10 DIAGNOSIS — M81.0 AGE-RELATED OSTEOPOROSIS WITHOUT CURRENT PATHOLOGICAL FRACTURE: Primary | ICD-10-CM

## 2023-11-10 PROCEDURE — 6360000002 HC RX W HCPCS: Performed by: PEDIATRICS

## 2023-11-10 PROCEDURE — 96372 THER/PROPH/DIAG INJ SC/IM: CPT

## 2023-11-10 RX ORDER — DIPHENHYDRAMINE HYDROCHLORIDE 50 MG/ML
50 INJECTION INTRAMUSCULAR; INTRAVENOUS
OUTPATIENT
Start: 2024-05-05

## 2023-11-10 RX ORDER — ACETAMINOPHEN 325 MG/1
650 TABLET ORAL
OUTPATIENT
Start: 2024-05-05

## 2023-11-10 RX ORDER — ONDANSETRON 2 MG/ML
8 INJECTION INTRAMUSCULAR; INTRAVENOUS
OUTPATIENT
Start: 2024-05-05

## 2023-11-10 RX ORDER — SODIUM CHLORIDE 9 MG/ML
INJECTION, SOLUTION INTRAVENOUS CONTINUOUS
OUTPATIENT
Start: 2024-05-05

## 2023-11-10 RX ORDER — EPINEPHRINE 1 MG/ML
0.3 INJECTION, SOLUTION, CONCENTRATE INTRAVENOUS PRN
OUTPATIENT
Start: 2024-05-05

## 2023-11-10 RX ORDER — ALBUTEROL SULFATE 90 UG/1
4 AEROSOL, METERED RESPIRATORY (INHALATION) PRN
OUTPATIENT
Start: 2024-05-05

## 2023-11-10 RX ADMIN — DENOSUMAB 60 MG: 60 INJECTION SUBCUTANEOUS at 09:30

## 2023-11-10 ASSESSMENT — PAIN SCALES - GENERAL: PAINLEVEL_OUTOF10: 0

## 2023-11-15 ENCOUNTER — OFFICE VISIT (OUTPATIENT)
Age: 78
End: 2023-11-15
Payer: MEDICARE

## 2023-11-15 VITALS
DIASTOLIC BLOOD PRESSURE: 80 MMHG | SYSTOLIC BLOOD PRESSURE: 108 MMHG | WEIGHT: 124 LBS | HEIGHT: 61 IN | HEART RATE: 83 BPM | OXYGEN SATURATION: 97 % | BODY MASS INDEX: 23.41 KG/M2

## 2023-11-15 DIAGNOSIS — R00.2 PALPITATION: ICD-10-CM

## 2023-11-15 DIAGNOSIS — E78.00 HYPERCHOLESTEREMIA: Primary | ICD-10-CM

## 2023-11-15 DIAGNOSIS — R06.02 SOB (SHORTNESS OF BREATH): ICD-10-CM

## 2023-11-15 DIAGNOSIS — Z82.49 FAMILY HISTORY OF ISCHEMIC HEART DISEASE AND OTHER DISEASES OF THE CIRCULATORY SYSTEM: ICD-10-CM

## 2023-11-15 PROCEDURE — 99213 OFFICE O/P EST LOW 20 MIN: CPT | Performed by: SPECIALIST

## 2023-11-15 PROCEDURE — 93005 ELECTROCARDIOGRAM TRACING: CPT | Performed by: SPECIALIST

## 2023-11-15 ASSESSMENT — PATIENT HEALTH QUESTIONNAIRE - PHQ9
SUM OF ALL RESPONSES TO PHQ QUESTIONS 1-9: 0
SUM OF ALL RESPONSES TO PHQ9 QUESTIONS 1 & 2: 0
SUM OF ALL RESPONSES TO PHQ QUESTIONS 1-9: 0
2. FEELING DOWN, DEPRESSED OR HOPELESS: 0
1. LITTLE INTEREST OR PLEASURE IN DOING THINGS: 0

## 2024-01-17 ENCOUNTER — OFFICE VISIT (OUTPATIENT)
Age: 79
End: 2024-01-17
Payer: MEDICARE

## 2024-01-17 VITALS
HEART RATE: 88 BPM | BODY MASS INDEX: 24 KG/M2 | OXYGEN SATURATION: 98 % | DIASTOLIC BLOOD PRESSURE: 86 MMHG | TEMPERATURE: 97.7 F | WEIGHT: 127 LBS | RESPIRATION RATE: 16 BRPM | SYSTOLIC BLOOD PRESSURE: 135 MMHG

## 2024-01-17 DIAGNOSIS — M81.0 AGE-RELATED OSTEOPOROSIS WITHOUT CURRENT PATHOLOGICAL FRACTURE: Primary | ICD-10-CM

## 2024-01-17 PROCEDURE — 1123F ACP DISCUSS/DSCN MKR DOCD: CPT | Performed by: PEDIATRICS

## 2024-01-17 PROCEDURE — 99214 OFFICE O/P EST MOD 30 MIN: CPT | Performed by: PEDIATRICS

## 2024-01-17 NOTE — PROGRESS NOTES
Chief Complaint   Patient presents with    Joint Pain     1. Have you been to the ER, urgent care clinic since your last visit?  Hospitalized since your last visit?No    2. Have you seen or consulted any other health care providers outside of the [facility] since your last visit?  Include any pap smears or colon screening. No    
mineral  density of the lumbar spine of 2.8%, of the left total hip of 1.2% and the right  total hip of 2.2%, and no change in the bone mineral density of the left distal  third radius.  10 year probability of major osteoporotic fracture: 10.4%.  10 year probability of hip fracture: 3.5%.    10/2019 DEXA BONE DENSITY STUDY AXIAL  This patient is osteoporotic using the World Health Organization criteria  As compared to the prior study, there has been an increase in the bone mineral  density of the lumbar spine of 2.2% and of the right total hip of 1.1%, and a  decrease in the bone mineral density of the left distal third radius of 2.6%.  10 year probability of major osteoporotic fracture: 15.2%  10 year probability of hip fracture: 7.7%     4/2017 DEXA BONE DENSITY STUDY AXIAL   Impression:  This patient is osteoporotic using the World Health Organization criteria  As compared to the prior study, there has been a significant 4.5% decrease in  the lumbar spine but no change in the hip. Please assess treatment compliance.    3/2015 DEXA BONE DENSITY STUDY AXIAL   IMPRESSION:  This patient is osteoporotic using the World Health Organization criteria  As compared to the prior study, there has been no significant change  10 year probability of major osteoporotic fracture:  7.8%  10 year probability of hip fracture:  1.7%    5/2013 DEXA BONE DENSITY STUDY AXIAL  IMPRESSION:  This patient is osteopenic using the World Health Organization criteria  As compared to the prior study, there has been 5.5% increase in the lumbar   spine and 0.1% increase in the total mean hip, compatible with response to   treatment  10 year probability of major osteoporotic fracture:  13.9%  10 year probability of hip fracture:  2.3%    2/2011 [facility] DXA BONE DENSITY STUDY AXIAL  IMPRESSION:  This patient is osteoporotic using the World Health Organization criteria  10 year probability of major osteoporotic fracture:  7.3%  10 year probability of hip

## 2024-06-05 ENCOUNTER — HOSPITAL ENCOUNTER (OUTPATIENT)
Facility: HOSPITAL | Age: 79
Setting detail: INFUSION SERIES
Discharge: HOME | End: 2024-06-05
Payer: MEDICARE

## 2024-06-05 VITALS
SYSTOLIC BLOOD PRESSURE: 121 MMHG | HEART RATE: 75 BPM | DIASTOLIC BLOOD PRESSURE: 68 MMHG | BODY MASS INDEX: 24 KG/M2 | HEIGHT: 61 IN | RESPIRATION RATE: 18 BRPM | TEMPERATURE: 97.9 F | OXYGEN SATURATION: 98 %

## 2024-06-05 DIAGNOSIS — M81.0 AGE-RELATED OSTEOPOROSIS WITHOUT CURRENT PATHOLOGICAL FRACTURE: Primary | ICD-10-CM

## 2024-06-05 LAB
ANION GAP BLD CALC-SCNC: 9.1 MMOL/L (ref 10–20)
CA-I BLD-MCNC: 1.2 MMOL/L (ref 1.12–1.32)
CHLORIDE BLD-SCNC: 105 MMOL/L (ref 98–107)
CO2 BLD-SCNC: 27.9 MMOL/L (ref 21–32)
CREAT BLD-MCNC: 0.74 MG/DL (ref 0.6–1.3)
GLUCOSE BLD-MCNC: 83 MG/DL (ref 70–110)
PHOSPHATE SERPL-MCNC: 4 MG/DL (ref 2.6–4.7)
POTASSIUM BLD-SCNC: 3.9 MMOL/L (ref 3.5–5.1)
SERVICE CMNT-IMP: ABNORMAL
SODIUM BLD-SCNC: 142 MMOL/L (ref 136–145)

## 2024-06-05 PROCEDURE — 36415 COLL VENOUS BLD VENIPUNCTURE: CPT

## 2024-06-05 PROCEDURE — 96372 THER/PROPH/DIAG INJ SC/IM: CPT

## 2024-06-05 PROCEDURE — 80047 BASIC METABLC PNL IONIZED CA: CPT

## 2024-06-05 PROCEDURE — 6360000002 HC RX W HCPCS: Performed by: PEDIATRICS

## 2024-06-05 PROCEDURE — 84100 ASSAY OF PHOSPHORUS: CPT

## 2024-06-05 RX ORDER — ONDANSETRON 2 MG/ML
8 INJECTION INTRAMUSCULAR; INTRAVENOUS
OUTPATIENT
Start: 2024-11-06

## 2024-06-05 RX ORDER — SODIUM CHLORIDE 9 MG/ML
INJECTION, SOLUTION INTRAVENOUS CONTINUOUS
OUTPATIENT
Start: 2024-11-06

## 2024-06-05 RX ORDER — ACETAMINOPHEN 325 MG/1
650 TABLET ORAL
OUTPATIENT
Start: 2024-11-06

## 2024-06-05 RX ORDER — DIPHENHYDRAMINE HYDROCHLORIDE 50 MG/ML
50 INJECTION INTRAMUSCULAR; INTRAVENOUS
OUTPATIENT
Start: 2024-11-06

## 2024-06-05 RX ORDER — EPINEPHRINE 1 MG/ML
0.3 INJECTION, SOLUTION INTRAMUSCULAR; SUBCUTANEOUS PRN
OUTPATIENT
Start: 2024-11-06

## 2024-06-05 RX ORDER — ALBUTEROL SULFATE 90 UG/1
4 AEROSOL, METERED RESPIRATORY (INHALATION) PRN
OUTPATIENT
Start: 2024-11-06

## 2024-06-05 RX ADMIN — DENOSUMAB 60 MG: 60 INJECTION SUBCUTANEOUS at 09:20

## 2024-06-05 ASSESSMENT — PAIN SCALES - GENERAL: PAINLEVEL_OUTOF10: 0

## 2024-06-05 NOTE — PLAN OF CARE
hospitals Short Note                       Date: 2024    Name: Matthew Ortega    MRN: [id number]         : 1945      Pt admit to hospitals for Prolia ambulatory in stable condition. Assessment completed and documented in flowsheets. Patient denies any upcoming invasive dental work. Labs drawn from right AC and sent for processing.      Ms. Ortega's vitals were reviewed:  Patient Vitals for the past 12 hrs:   Temp Pulse Resp BP SpO2   24 0843 97.9 °F (36.6 °C) 75 18 121/68 98 %         Lab results were obtained and reviewed. Labs within parameter for treatment.  Recent Results (from the past 12 hour(s))   POC CHEM 8    Collection Time: 24  8:57 AM   Result Value Ref Range    POC Ionized Calcium 1.20 1.12 - 1.32 mmol/L    POC Sodium 142 136 - 145 mmol/L    POC Potassium 3.9 3.5 - 5.1 mmol/L    POC Chloride 105 98 - 107 mmol/L    POC TCO2 27.9 21 - 32 mmol/L    Anion Gap, POC 9.1 (L) 10 - 20 mmol/L    POC Glucose 83 70 - 110 mg/dL    POC Creatinine 0.74 0.6 - 1.3 mg/dL    eGFR, POC 83 >60 ml/min/1.73m2    UA Comment Comment Not Indicated.         Medications given:   Medications Administered         denosumab (PROLIA) SC injection 60 mg Admin Date  2024 Action  Given Dose  60 mg Route  SubCUTAneous Administered By  Rose Perez RN               Ms. Ortega tolerated the injection and [facility]. Patient is aware if future appointments.    Future Appointments   Date Time Provider Department Center   2024 11:40 AM Suellen Mccartney MD [facility] AMB   2024 11:40 AM Suellen Mccartney MD [facility] AMB       Rose Perez RN  2024  9:25 AM   Problem: Pain  Goal: Verbalizes/displays adequate comfort level or baseline comfort level  Outcome: Progressing     Problem: Safety - Adult  Goal: Free from fall injury  Outcome: Progressing

## 2024-06-18 ENCOUNTER — OFFICE VISIT (OUTPATIENT)
Age: 79
End: 2024-06-18
Payer: MEDICARE

## 2024-06-18 VITALS
HEIGHT: 61 IN | DIASTOLIC BLOOD PRESSURE: 80 MMHG | BODY MASS INDEX: 23.41 KG/M2 | RESPIRATION RATE: 18 BRPM | SYSTOLIC BLOOD PRESSURE: 118 MMHG | OXYGEN SATURATION: 99 % | WEIGHT: 124 LBS | HEART RATE: 80 BPM

## 2024-06-18 DIAGNOSIS — E78.00 HYPERCHOLESTEREMIA: Primary | ICD-10-CM

## 2024-06-18 DIAGNOSIS — R00.2 PALPITATION: ICD-10-CM

## 2024-06-18 DIAGNOSIS — Z82.49 FAMILY HISTORY OF ISCHEMIC HEART DISEASE AND OTHER DISEASES OF THE CIRCULATORY SYSTEM: ICD-10-CM

## 2024-06-18 DIAGNOSIS — R06.02 SOB (SHORTNESS OF BREATH): ICD-10-CM

## 2024-06-18 PROCEDURE — 99214 OFFICE O/P EST MOD 30 MIN: CPT | Performed by: SPECIALIST

## 2024-06-18 PROCEDURE — 1123F ACP DISCUSS/DSCN MKR DOCD: CPT | Performed by: SPECIALIST

## 2024-06-18 RX ORDER — MECLIZINE HCL 12.5 MG/1
TABLET ORAL AS NEEDED
COMMUNITY
Start: 2024-01-31

## 2024-06-18 ASSESSMENT — PATIENT HEALTH QUESTIONNAIRE - PHQ9
SUM OF ALL RESPONSES TO PHQ QUESTIONS 1-9: 0
SUM OF ALL RESPONSES TO PHQ9 QUESTIONS 1 & 2: 0
SUM OF ALL RESPONSES TO PHQ QUESTIONS 1-9: 0
1. LITTLE INTEREST OR PLEASURE IN DOING THINGS: NOT AT ALL
2. FEELING DOWN, DEPRESSED OR HOPELESS: NOT AT ALL

## 2024-06-18 NOTE — PATIENT INSTRUCTIONS
You have been scheduled for a nuclear stress test and an echo.    Call [phone] to schedule your Coronary Calcium Score. It is about $130 out of pocket.    We will call with results and see you back for an annual follow up.    Stress Test:    Nuclear stress testing evaluates blood flow to your heart muscle and assesses cardiac function. There are 2 parts (Rest/Stress) to this procedure and will include exercise on a treadmill.    *Please arrive 15 minutes prior to your appointment time    Test Duration:    -One day testing will take 4 hours    Day of testing instructions:    NO CAFFEINE (not even decaffeinated products) 24 HOURS PRIOR TO TESTING. This includes coffee, soda, tea, chocolate, multivitamins, and migraine medication, like Excedrin or Fioricet that contains caffeine.  Nothing to eat or drink 4 HOURS prior to testing  NO NICOTINE 12 hours prior to testing  Take medications as directed with a few sips of water. DIABETIC PATIENTS: Take half of your insulin with a light meal 4 hours before your test.  Wear comfortable clothes and shoes (Shirts with no metal, shorts or pants, tennis shoes, no heels or flip flops).

## 2024-06-18 NOTE — PROGRESS NOTES
Matthew Ortega     1945       Suellen Mccartney MD, [facility]  Date of Visit-6/18/2024   PCP is Becky Beasley MD   [facility]  [facility]  HPI:  Matthew Ortega is a 78 y.o. female   has a past medical history of Family history of cardiac disorder (2/17/2016), Hypercholesteremia (2/17/2016), and Menopause.   6-month follow-up  She takes Lipitor.  She continues to have shortness of breath.  She now is worried about coronary disease as she had a relative in [place] have heart attack.  Her son has given her a list of test they would like.  She has chest discomfort.  She denies syncope or edema she has no palpitations or tachycardia.  Previous LDL was 89.      Echo 2016 and routine treadmill normal at 6 minutes  Prior EKG with LVH  [facility] 5/8/2021- EF 84% late R wave progression no ischemia  Echo 5/28/2021 EF 55% normal  XOL patient has been on lovastatin 10 mg previously with an  and then 110, we suggested a change to a higher potency statin for primary prevention based on these numbers.  She wanted to wait     EKG 11/15/2023 shows sinus rhythm, left axis deviation, heart rate 75, borderline criteria for LVH aVL  Assessment/Plan:     Patient Instructions   You have been scheduled for a nuclear stress test and an echo.    Call [phone] to schedule your Coronary Calcium Score. It is about $130 out of pocket.    We will call with results and see you back for an annual follow up.    Stress Test:    Nuclear stress testing evaluates blood flow to your heart muscle and assesses cardiac function. There are 2 parts (Rest/Stress) to this procedure and will include exercise on a treadmill.    *Please arrive 15 minutes prior to your appointment time    Test Duration:    -One day testing will take 4 hours    Day of testing instructions:    NO CAFFEINE (not even decaffeinated products) 24 HOURS PRIOR TO TESTING. This includes coffee, soda, tea, chocolate, multivitamins,

## 2024-06-28 ENCOUNTER — TRANSCRIBE ORDERS (OUTPATIENT)
Facility: HOSPITAL | Age: 79
End: 2024-06-28

## 2024-06-28 ENCOUNTER — HOSPITAL ENCOUNTER (OUTPATIENT)
Facility: HOSPITAL | Age: 79
Discharge: HOME | End: 2024-06-28
Attending: SPECIALIST

## 2024-06-28 DIAGNOSIS — Z12.31 SCREENING MAMMOGRAM FOR HIGH-RISK PATIENT: Primary | ICD-10-CM

## 2024-06-28 DIAGNOSIS — Z82.49 FAMILY HISTORY OF ISCHEMIC HEART DISEASE AND OTHER DISEASES OF THE CIRCULATORY SYSTEM: ICD-10-CM

## 2024-06-28 DIAGNOSIS — R06.02 SOB (SHORTNESS OF BREATH): ICD-10-CM

## 2024-06-28 PROCEDURE — 75571 CT HRT W/O DYE W/CA TEST: CPT

## 2024-07-11 ENCOUNTER — ANCILLARY PROCEDURE (OUTPATIENT)
Age: 79
End: 2024-07-11
Payer: MEDICARE

## 2024-07-11 VITALS
HEIGHT: 61 IN | WEIGHT: 124 LBS | HEART RATE: 68 BPM | DIASTOLIC BLOOD PRESSURE: 76 MMHG | BODY MASS INDEX: 23.41 KG/M2 | SYSTOLIC BLOOD PRESSURE: 112 MMHG

## 2024-07-11 VITALS
DIASTOLIC BLOOD PRESSURE: 80 MMHG | SYSTOLIC BLOOD PRESSURE: 118 MMHG | BODY MASS INDEX: 23.41 KG/M2 | WEIGHT: 124 LBS | HEIGHT: 61 IN

## 2024-07-11 DIAGNOSIS — R06.02 SOB (SHORTNESS OF BREATH): ICD-10-CM

## 2024-07-11 DIAGNOSIS — Z82.49 FAMILY HISTORY OF ISCHEMIC HEART DISEASE AND OTHER DISEASES OF THE CIRCULATORY SYSTEM: ICD-10-CM

## 2024-07-11 PROCEDURE — 78452 HT MUSCLE IMAGE SPECT MULT: CPT | Performed by: SPECIALIST

## 2024-07-11 PROCEDURE — A9500 TC99M SESTAMIBI: HCPCS | Performed by: SPECIALIST

## 2024-07-11 PROCEDURE — 93306 TTE W/DOPPLER COMPLETE: CPT | Performed by: SPECIALIST

## 2024-07-11 RX ORDER — TETRAKIS(2-METHOXYISOBUTYLISOCYANIDE)COPPER(I) TETRAFLUOROBORATE 1 MG/ML
23.8 INJECTION, POWDER, LYOPHILIZED, FOR SOLUTION INTRAVENOUS
Status: COMPLETED | OUTPATIENT
Start: 2024-07-11 | End: 2024-07-11

## 2024-07-11 RX ORDER — TETRAKIS(2-METHOXYISOBUTYLISOCYANIDE)COPPER(I) TETRAFLUOROBORATE 1 MG/ML
8.6 INJECTION, POWDER, LYOPHILIZED, FOR SOLUTION INTRAVENOUS
Status: COMPLETED | OUTPATIENT
Start: 2024-07-11 | End: 2024-07-11

## 2024-07-11 RX ADMIN — TECHNETIUM TC-99M SESTAMIBI 8.6 MILLICURIE: 1 INJECTION INTRAVENOUS at 08:15

## 2024-07-11 RX ADMIN — TECHNETIUM TC-99M SESTAMIBI 23.8 MILLICURIE: 1 INJECTION INTRAVENOUS at 10:02

## 2024-07-12 LAB
ECHO BSA: 1.56 M2
NUC STRESS EJECTION FRACTION: 80 %
STRESS ANGINA INDEX: 0
STRESS BASELINE DIAS BP: 76 MMHG
STRESS BASELINE HR: 62 BPM
STRESS BASELINE ST DEPRESSION: 0 MM
STRESS BASELINE SYS BP: 112 MMHG
STRESS ESTIMATED WORKLOAD: 7 METS
STRESS EXERCISE DUR MIN: 5 MIN
STRESS EXERCISE DUR SEC: 11 SEC
STRESS O2 SAT PEAK: 98 %
STRESS O2 SAT REST: 98 %
STRESS PEAK DIAS BP: 70 MMHG
STRESS PEAK SYS BP: 134 MMHG
STRESS PERCENT HR ACHIEVED: 93 %
STRESS POST PEAK HR: 133 BPM
STRESS RATE PRESSURE PRODUCT: NORMAL BPM*MMHG
STRESS SR DUKE TREADMILL SCORE: 5
STRESS ST DEPRESSION: 0 MM
STRESS TARGET HR: 143 BPM
TID: 0.97

## 2024-07-12 PROCEDURE — 78452 HT MUSCLE IMAGE SPECT MULT: CPT | Performed by: SPECIALIST

## 2024-07-12 PROCEDURE — PBSHW PBB SHADOW CHARGE: Performed by: SPECIALIST

## 2024-07-12 PROCEDURE — 93018 CV STRESS TEST I&R ONLY: CPT | Performed by: SPECIALIST

## 2024-07-12 PROCEDURE — 93016 CV STRESS TEST SUPVJ ONLY: CPT | Performed by: SPECIALIST

## 2024-07-15 LAB
ECHO AO ASC DIAM: 3.3 CM
ECHO AO ASCENDING AORTA INDEX: 2.14 CM/M2
ECHO AO ROOT DIAM: 3 CM
ECHO AO ROOT INDEX: 1.95 CM/M2
ECHO AV AREA PEAK VELOCITY: 2.8 CM2
ECHO AV AREA VTI: 3.1 CM2
ECHO AV AREA/BSA PEAK VELOCITY: 1.8 CM2/M2
ECHO AV AREA/BSA VTI: 2 CM2/M2
ECHO AV MEAN GRADIENT: 2 MMHG
ECHO AV MEAN VELOCITY: 0.7 M/S
ECHO AV PEAK GRADIENT: 5 MMHG
ECHO AV PEAK VELOCITY: 1.1 M/S
ECHO AV VELOCITY RATIO: 0.91
ECHO AV VTI: 25.1 CM
ECHO BSA: 1.56 M2
ECHO EST RA PRESSURE: 3 MMHG
ECHO LA DIAMETER INDEX: 1.69 CM/M2
ECHO LA DIAMETER: 2.6 CM
ECHO LA TO AORTIC ROOT RATIO: 0.87
ECHO LA VOL A-L A2C: 29 ML (ref 22–52)
ECHO LA VOL A-L A4C: 23 ML (ref 22–52)
ECHO LA VOL BP: 25 ML (ref 22–52)
ECHO LA VOL MOD A2C: 27 ML (ref 22–52)
ECHO LA VOL MOD A4C: 22 ML (ref 22–52)
ECHO LA VOL/BSA BIPLANE: 16 ML/M2 (ref 16–34)
ECHO LA VOLUME AREA LENGTH: 26 ML
ECHO LA VOLUME INDEX A-L A2C: 19 ML/M2 (ref 16–34)
ECHO LA VOLUME INDEX A-L A4C: 15 ML/M2 (ref 16–34)
ECHO LA VOLUME INDEX AREA LENGTH: 17 ML/M2 (ref 16–34)
ECHO LA VOLUME INDEX MOD A2C: 18 ML/M2 (ref 16–34)
ECHO LA VOLUME INDEX MOD A4C: 14 ML/M2 (ref 16–34)
ECHO LV E' LATERAL VELOCITY: 9 CM/S
ECHO LV E' SEPTAL VELOCITY: 9 CM/S
ECHO LV EDV A2C: 34 ML
ECHO LV EDV A4C: 40 ML
ECHO LV EDV BP: 37 ML (ref 56–104)
ECHO LV EDV INDEX A4C: 26 ML/M2
ECHO LV EDV INDEX BP: 24 ML/M2
ECHO LV EDV NDEX A2C: 22 ML/M2
ECHO LV EJECTION FRACTION A2C: 63 %
ECHO LV EJECTION FRACTION A4C: 63 %
ECHO LV EJECTION FRACTION BIPLANE: 64 % (ref 55–100)
ECHO LV ESV A2C: 13 ML
ECHO LV ESV A4C: 15 ML
ECHO LV ESV BP: 14 ML (ref 19–49)
ECHO LV ESV INDEX A2C: 8 ML/M2
ECHO LV ESV INDEX A4C: 10 ML/M2
ECHO LV ESV INDEX BP: 9 ML/M2
ECHO LV FRACTIONAL SHORTENING: 51 % (ref 28–44)
ECHO LV INTERNAL DIMENSION DIASTOLE INDEX: 2.53 CM/M2
ECHO LV INTERNAL DIMENSION DIASTOLIC: 3.9 CM (ref 3.9–5.3)
ECHO LV INTERNAL DIMENSION SYSTOLIC INDEX: 1.23 CM/M2
ECHO LV INTERNAL DIMENSION SYSTOLIC: 1.9 CM
ECHO LV IVSD: 0.8 CM (ref 0.6–0.9)
ECHO LV MASS 2D: 89.7 G (ref 67–162)
ECHO LV MASS INDEX 2D: 58.2 G/M2 (ref 43–95)
ECHO LV POSTERIOR WALL DIASTOLIC: 0.8 CM (ref 0.6–0.9)
ECHO LV RELATIVE WALL THICKNESS RATIO: 0.41
ECHO LVOT AREA: 3.1 CM2
ECHO LVOT AV VTI INDEX: 1.03
ECHO LVOT DIAM: 2 CM
ECHO LVOT MEAN GRADIENT: 2 MMHG
ECHO LVOT PEAK GRADIENT: 4 MMHG
ECHO LVOT PEAK VELOCITY: 1 M/S
ECHO LVOT STROKE VOLUME INDEX: 52.6 ML/M2
ECHO LVOT SV: 81 ML
ECHO LVOT VTI: 25.8 CM
ECHO MV A VELOCITY: 0.63 M/S
ECHO MV E DECELERATION TIME (DT): 226.5 MS
ECHO MV E VELOCITY: 0.73 M/S
ECHO MV E/A RATIO: 1.16
ECHO MV E/E' LATERAL: 8.11
ECHO MV E/E' RATIO (AVERAGED): 8.11
ECHO MV E/E' SEPTAL: 8.11
ECHO RIGHT VENTRICULAR SYSTOLIC PRESSURE (RVSP): 18 MMHG
ECHO RV BASAL DIMENSION: 3 CM
ECHO RV FREE WALL PEAK S': 11 CM/S
ECHO RV TAPSE: 1.8 CM (ref 1.7–?)
ECHO TV REGURGITANT MAX VELOCITY: 1.92 M/S
ECHO TV REGURGITANT PEAK GRADIENT: 15 MMHG

## 2024-07-15 PROCEDURE — 93306 TTE W/DOPPLER COMPLETE: CPT | Performed by: SPECIALIST

## 2024-07-18 NOTE — RESULT ENCOUNTER NOTE
Nuclear stress test is normal  Nurse to call pt for test result   Future Appointments  7/26/2024  8:00 AM    SPT MAMMO 1                SPTMAMMO            [place] [facility]  12/11/2024 9:00 AM    MATHEW FASTTRACK 3            [facility]              [facility]  6/24/2025  10:40 AM   Suellen Mccartney MD       CAV                 BS AMB   Keep same plans for follow up and medications as per unit protocol admitted for depression and suicidal ideations admitted for depression and suicidal ideations as per unit protocol as per unit protocol

## 2024-07-18 NOTE — RESULT ENCOUNTER NOTE
Let the patient know the echo was essentially normal  So was nuke  The calcium score is mild at 40th percentile  Would not make any changes      No problems  Keep fu as planned  Future Appointments  7/26/2024  8:00 AM    SPT MAMMO 1                SPTMAMMO            [place] [facility]  12/11/2024 9:00 AM    MATHEW FASTTRACK 3            [facility]              [facility]  6/24/2025  10:40 AM   Suellen Mccartney MD       CAV                 BS AMB   Thank you

## 2024-07-19 ENCOUNTER — TELEPHONE (OUTPATIENT)
Age: 79
End: 2024-07-19

## 2024-07-26 ENCOUNTER — HOSPITAL ENCOUNTER (OUTPATIENT)
Facility: HOSPITAL | Age: 79
End: 2024-07-26
Payer: MEDICARE

## 2024-07-26 DIAGNOSIS — Z12.31 SCREENING MAMMOGRAM FOR HIGH-RISK PATIENT: ICD-10-CM

## 2024-07-26 PROCEDURE — 77063 BREAST TOMOSYNTHESIS BI: CPT

## 2024-08-26 ENCOUNTER — TELEPHONE (OUTPATIENT)
Age: 79
End: 2024-08-26

## 2024-09-06 ENCOUNTER — OFFICE VISIT (OUTPATIENT)
Age: 79
End: 2024-09-06
Payer: MEDICARE

## 2024-09-06 VITALS
OXYGEN SATURATION: 98 % | RESPIRATION RATE: 16 BRPM | SYSTOLIC BLOOD PRESSURE: 115 MMHG | BODY MASS INDEX: 23.98 KG/M2 | TEMPERATURE: 97.7 F | DIASTOLIC BLOOD PRESSURE: 74 MMHG | WEIGHT: 127 LBS | HEART RATE: 79 BPM | HEIGHT: 61 IN

## 2024-09-06 DIAGNOSIS — E78.5 DYSLIPIDEMIA, GOAL LDL BELOW 130: ICD-10-CM

## 2024-09-06 DIAGNOSIS — R68.2 DRY MOUTH: ICD-10-CM

## 2024-09-06 DIAGNOSIS — M81.0 AGE-RELATED OSTEOPOROSIS WITHOUT CURRENT PATHOLOGICAL FRACTURE: ICD-10-CM

## 2024-09-06 DIAGNOSIS — E53.8 VITAMIN B12 DEFICIENCY: ICD-10-CM

## 2024-09-06 DIAGNOSIS — M47.22 OSTEOARTHRITIS OF SPINE WITH RADICULOPATHY, CERVICAL REGION: ICD-10-CM

## 2024-09-06 DIAGNOSIS — Z76.89 ESTABLISHING CARE WITH NEW DOCTOR, ENCOUNTER FOR: Primary | ICD-10-CM

## 2024-09-06 DIAGNOSIS — G44.229 CHRONIC TENSION-TYPE HEADACHE, NOT INTRACTABLE: ICD-10-CM

## 2024-09-06 DIAGNOSIS — R42 CHRONIC VERTIGO: ICD-10-CM

## 2024-09-06 PROCEDURE — 99203 OFFICE O/P NEW LOW 30 MIN: CPT | Performed by: FAMILY MEDICINE

## 2024-09-06 RX ORDER — MELOXICAM 7.5 MG/1
7.5 TABLET ORAL DAILY
Qty: 90 TABLET | Refills: 1 | Status: SHIPPED | OUTPATIENT
Start: 2024-09-06

## 2024-09-06 RX ORDER — BACLOFEN 10 MG/1
5 TABLET ORAL NIGHTLY PRN
Qty: 30 TABLET | Refills: 0 | Status: SHIPPED | OUTPATIENT
Start: 2024-09-06

## 2024-09-06 RX ORDER — MECLIZINE HCL 12.5 MG 12.5 MG/1
12.5 TABLET ORAL AS NEEDED
Qty: 30 TABLET | Refills: 1 | Status: SHIPPED | OUTPATIENT
Start: 2024-09-06

## 2024-09-06 SDOH — ECONOMIC STABILITY: FOOD INSECURITY: WITHIN THE PAST 12 MONTHS, YOU WORRIED THAT YOUR FOOD WOULD RUN OUT BEFORE YOU GOT MONEY TO BUY MORE.: NEVER TRUE

## 2024-09-06 SDOH — ECONOMIC STABILITY: FOOD INSECURITY: WITHIN THE PAST 12 MONTHS, THE FOOD YOU BOUGHT JUST DIDN'T LAST AND YOU DIDN'T HAVE MONEY TO GET MORE.: NEVER TRUE

## 2024-09-06 SDOH — ECONOMIC STABILITY: INCOME INSECURITY: HOW HARD IS IT FOR YOU TO PAY FOR THE VERY BASICS LIKE FOOD, HOUSING, MEDICAL CARE, AND HEATING?: NOT HARD AT ALL

## 2024-09-06 ASSESSMENT — ENCOUNTER SYMPTOMS
SORE THROAT: 0
CHEST TIGHTNESS: 0
COUGH: 0
SHORTNESS OF BREATH: 0
ABDOMINAL PAIN: 0
DIARRHEA: 0
BACK PAIN: 0
CONSTIPATION: 0

## 2024-09-06 ASSESSMENT — PATIENT HEALTH QUESTIONNAIRE - PHQ9
SUM OF ALL RESPONSES TO PHQ QUESTIONS 1-9: 0
2. FEELING DOWN, DEPRESSED OR HOPELESS: NOT AT ALL
SUM OF ALL RESPONSES TO PHQ QUESTIONS 1-9: 0
SUM OF ALL RESPONSES TO PHQ9 QUESTIONS 1 & 2: 0
1. LITTLE INTEREST OR PLEASURE IN DOING THINGS: NOT AT ALL

## 2024-09-06 NOTE — ASSESSMENT & PLAN NOTE
Monitored by specialist- no acute findings meriting change in the plan  Follows Dr. Acevedo, currently on Prolia

## 2024-09-06 NOTE — ASSESSMENT & PLAN NOTE
Chronic, at goal (stable), continue current treatment plan, medication adherence emphasized, and lifestyle modifications recommended stable on current atorvastatin 10 mg.  Reviewed last blood work results from previous PCP

## 2024-09-06 NOTE — PROGRESS NOTES
Chief Complaint   Patient presents with    Neck Pain     And headache x 3 weeks. Pain is worse at night during sleep         \"Have you been to the ER, urgent care clinic since your last visit?  Hospitalized since your last visit?\"    NO    “Have you seen or consulted any other health care providers outside of [facility] since your last visit?”    NO            Click Here for Release of Records Request           9/6/2024    11:36 AM   PHQ-9    Little interest or pleasure in doing things 0   Feeling down, depressed, or hopeless 0   PHQ-2 Score 0   PHQ-9 Total Score 0           Financial Resource Strain: Low Risk  (9/6/2024)    Overall Financial Resource Strain (CARDIA)     Difficulty of Paying Living Expenses: Not hard at all      Food Insecurity: No Food Insecurity (9/6/2024)    Hunger Vital Sign     Worried About Running Out of Food in the Last Year: Never true     Ran Out of Food in the Last Year: Never true          Health Maintenance Due   Topic Date Due    Hepatitis C screen  Never done    DTaP/Tdap/Td vaccine (1 - Tdap) Never done    Shingles vaccine (1 of 2) Never done    Respiratory Syncytial Virus (RSV) Pregnant or age 60 yrs+ (1 - 1-dose 60+ series) Never done    Pneumococcal 65+ years Vaccine (1 of 1 - PCV) Never done    Annual Wellness Visit (Medicare Advantage)  Never done    Flu vaccine (1) 08/01/2024    COVID-19 Vaccine (3 - 2023-24 season) 09/01/2024        
baclofen (LIORESAL) 10 MG tablet; Take 0.5 tablets by mouth nightly as needed (muscle spasms), Disp-30 tablet, R-0Normal  4. Dry mouth  -     CBC with Auto Differential; Future  -     EDGAR, Direct, w/Reflex; Future  -     CCP Antibodies, IGG/IGA; Future  -     C-Reactive Protein; Future  5. Chronic vertigo  -     CBC with Auto Differential; Future  -     meclizine (ANTIVERT) 12.5 MG tablet; Take 1 tablet by mouth as needed for Dizziness, Disp-30 tablet, R-1Normal  6. Age-related osteoporosis without current pathological fracture  Assessment & Plan:   Monitored by specialist- no acute findings meriting change in the plan  Follows Dr. Acevedo, currently on Prolia  7. Vitamin B12 deficiency  8. Dyslipidemia, goal LDL below 130  Assessment & Plan:   Chronic, at goal (stable), continue current treatment plan, medication adherence emphasized, and lifestyle modifications recommended stable on current atorvastatin 10 mg.  Reviewed last blood work results from previous PCP     Discussed with patient that most of his symptoms are related to tension headache from significant stiffness in neck muscles.  Her previous MRI showing degenerative arthritis and cervical spine with mild bilateral foraminal stenosis.  We discussed muscle relaxer, NSAID with home physical therapy.  If no improvement, we will get professional physical therapy and do more workup.    Discussed lifestyle issues and health guidance given  Patient was given an after visit summary which includes diagnoses, vital signs, current medications, instructions and references & authorized prescriptions . Results of labs will be conveyed to patient, once available.  Pt verbalized instructions I provided and expressed understanding of discussion that was held today.    Please note that this dictation was completed with [employer], the [employer] voice recognition software.  Quite often unanticipated grammatical, syntax, homophones, and other interpretive errors are inadvertently

## 2024-09-07 LAB
BASOPHILS # BLD: 0 K/UL (ref 0–0.1)
BASOPHILS NFR BLD: 0 % (ref 0–1)
CRP SERPL-MCNC: 0.32 MG/DL (ref 0–0.3)
DIFFERENTIAL METHOD BLD: ABNORMAL
EOSINOPHIL # BLD: 0 K/UL (ref 0–0.4)
EOSINOPHIL NFR BLD: 1 % (ref 0–7)
ERYTHROCYTE [DISTWIDTH] IN BLOOD BY AUTOMATED COUNT: 15.4 % (ref 11.5–14.5)
HCT VFR BLD AUTO: 40.4 % (ref 35–47)
HGB BLD-MCNC: 12.8 G/DL (ref 11.5–16)
IMM GRANULOCYTES # BLD AUTO: 0 K/UL (ref 0–0.04)
IMM GRANULOCYTES NFR BLD AUTO: 0 % (ref 0–0.5)
LYMPHOCYTES # BLD: 2 K/UL (ref 0.8–3.5)
LYMPHOCYTES NFR BLD: 32 % (ref 12–49)
MCH RBC QN AUTO: 27.8 PG (ref 26–34)
MCHC RBC AUTO-ENTMCNC: 31.7 G/DL (ref 30–36.5)
MCV RBC AUTO: 87.8 FL (ref 80–99)
MONOCYTES # BLD: 0.5 K/UL (ref 0–1)
MONOCYTES NFR BLD: 9 % (ref 5–13)
NEUTS SEG # BLD: 3.5 K/UL (ref 1.8–8)
NEUTS SEG NFR BLD: 58 % (ref 32–75)
NRBC # BLD: 0 K/UL (ref 0–0.01)
NRBC BLD-RTO: 0 PER 100 WBC
PLATELET # BLD AUTO: 189 K/UL (ref 150–400)
PMV BLD AUTO: 11.7 FL (ref 8.9–12.9)
RBC # BLD AUTO: 4.6 M/UL (ref 3.8–5.2)
WBC # BLD AUTO: 6.1 K/UL (ref 3.6–11)

## 2024-09-09 LAB — CCP IGA+IGG SERPL IA-ACNC: 7 UNITS (ref 0–19)

## 2024-09-10 LAB — ANA SER QL: NEGATIVE

## 2024-12-03 RX ORDER — ALBUTEROL SULFATE 90 UG/1
4 INHALANT RESPIRATORY (INHALATION) PRN
OUTPATIENT
Start: 2024-12-03

## 2024-12-03 RX ORDER — EPINEPHRINE 1 MG/ML
0.3 INJECTION, SOLUTION, CONCENTRATE INTRAVENOUS PRN
OUTPATIENT
Start: 2024-12-03

## 2024-12-03 RX ORDER — SODIUM CHLORIDE 9 MG/ML
INJECTION, SOLUTION INTRAVENOUS CONTINUOUS
OUTPATIENT
Start: 2024-12-03

## 2024-12-03 RX ORDER — HYDROCORTISONE SODIUM SUCCINATE 100 MG/2ML
100 INJECTION INTRAMUSCULAR; INTRAVENOUS
OUTPATIENT
Start: 2024-12-03

## 2024-12-03 RX ORDER — ACETAMINOPHEN 325 MG/1
650 TABLET ORAL
OUTPATIENT
Start: 2024-12-03

## 2024-12-03 RX ORDER — FAMOTIDINE 10 MG/ML
20 INJECTION, SOLUTION INTRAVENOUS
OUTPATIENT
Start: 2024-12-03

## 2024-12-03 RX ORDER — ONDANSETRON 2 MG/ML
8 INJECTION INTRAMUSCULAR; INTRAVENOUS
OUTPATIENT
Start: 2024-12-03

## 2024-12-03 RX ORDER — DIPHENHYDRAMINE HYDROCHLORIDE 50 MG/ML
50 INJECTION INTRAMUSCULAR; INTRAVENOUS
OUTPATIENT
Start: 2024-12-03

## 2025-01-13 ENCOUNTER — TELEPHONE (OUTPATIENT)
Age: 80
End: 2025-01-13

## 2025-01-13 NOTE — TELEPHONE ENCOUNTER
Pt called asking if pt can get an earlier appt that may 6 ANNUAL WELLNIESS VISIT. Pt is asking for the nurse her a call back.      BCBN [phone]

## 2025-01-15 NOTE — TELEPHONE ENCOUNTER
Attempted to call patient several times on best call back number but it goes straight to voicemail, tried home phone no response. To find out when exactly patient needs to schedule wellness visit. Please contact patient and schedule medicare wellness Earliest available

## 2025-01-20 ENCOUNTER — OFFICE VISIT (OUTPATIENT)
Age: 80
End: 2025-01-20
Payer: MEDICARE

## 2025-01-20 VITALS
DIASTOLIC BLOOD PRESSURE: 75 MMHG | RESPIRATION RATE: 16 BRPM | TEMPERATURE: 97.5 F | SYSTOLIC BLOOD PRESSURE: 116 MMHG | BODY MASS INDEX: 24.35 KG/M2 | WEIGHT: 129 LBS | HEIGHT: 61 IN | HEART RATE: 81 BPM | OXYGEN SATURATION: 99 %

## 2025-01-20 DIAGNOSIS — R73.01 IMPAIRED FASTING BLOOD SUGAR: ICD-10-CM

## 2025-01-20 DIAGNOSIS — M81.0 AGE-RELATED OSTEOPOROSIS WITHOUT CURRENT PATHOLOGICAL FRACTURE: ICD-10-CM

## 2025-01-20 DIAGNOSIS — E53.8 VITAMIN B12 DEFICIENCY: ICD-10-CM

## 2025-01-20 DIAGNOSIS — Z11.59 NEED FOR HEPATITIS C SCREENING TEST: ICD-10-CM

## 2025-01-20 DIAGNOSIS — Z71.89 ACP (ADVANCE CARE PLANNING): ICD-10-CM

## 2025-01-20 DIAGNOSIS — Z00.00 MEDICARE ANNUAL WELLNESS VISIT, SUBSEQUENT: Primary | ICD-10-CM

## 2025-01-20 DIAGNOSIS — E78.5 DYSLIPIDEMIA, GOAL LDL BELOW 130: ICD-10-CM

## 2025-01-20 DIAGNOSIS — Z78.0 ASYMPTOMATIC MENOPAUSAL STATE: ICD-10-CM

## 2025-01-20 DIAGNOSIS — M47.22 OSTEOARTHRITIS OF SPINE WITH RADICULOPATHY, CERVICAL REGION: ICD-10-CM

## 2025-01-20 LAB
25(OH)D3 SERPL-MCNC: 44.8 NG/ML (ref 30–100)
ALBUMIN SERPL-MCNC: 3.9 G/DL (ref 3.5–5)
ALBUMIN/GLOB SERPL: 1.1 (ref 1.1–2.2)
ALP SERPL-CCNC: 58 U/L (ref 45–117)
ALT SERPL-CCNC: 16 U/L (ref 12–78)
ANION GAP SERPL CALC-SCNC: 3 MMOL/L (ref 2–12)
AST SERPL-CCNC: 20 U/L (ref 15–37)
BASOPHILS # BLD: 0.02 K/UL (ref 0–0.1)
BASOPHILS NFR BLD: 0.4 % (ref 0–1)
BILIRUB SERPL-MCNC: 0.5 MG/DL (ref 0.2–1)
BUN SERPL-MCNC: 13 MG/DL (ref 6–20)
BUN/CREAT SERPL: 16 (ref 12–20)
CALCIUM SERPL-MCNC: 9.6 MG/DL (ref 8.5–10.1)
CHLORIDE SERPL-SCNC: 106 MMOL/L (ref 97–108)
CHOLEST SERPL-MCNC: 206 MG/DL
CO2 SERPL-SCNC: 29 MMOL/L (ref 21–32)
CREAT SERPL-MCNC: 0.83 MG/DL (ref 0.55–1.02)
DIFFERENTIAL METHOD BLD: ABNORMAL
EOSINOPHIL # BLD: 0.03 K/UL (ref 0–0.4)
EOSINOPHIL NFR BLD: 0.5 % (ref 0–7)
ERYTHROCYTE [DISTWIDTH] IN BLOOD BY AUTOMATED COUNT: 14.7 % (ref 11.5–14.5)
EST. AVERAGE GLUCOSE BLD GHB EST-MCNC: 120 MG/DL
GLOBULIN SER CALC-MCNC: 3.6 G/DL (ref 2–4)
GLUCOSE SERPL-MCNC: 92 MG/DL (ref 65–100)
HBA1C MFR BLD: 5.8 % (ref 4–5.6)
HCT VFR BLD AUTO: 42.3 % (ref 35–47)
HCV AB SER IA-ACNC: 0.19 INDEX
HCV AB SERPL QL IA: NONREACTIVE
HDLC SERPL-MCNC: 74 MG/DL
HDLC SERPL: 2.8 (ref 0–5)
HGB BLD-MCNC: 13.3 G/DL (ref 11.5–16)
IMM GRANULOCYTES # BLD AUTO: 0.02 K/UL (ref 0–0.04)
IMM GRANULOCYTES NFR BLD AUTO: 0.4 % (ref 0–0.5)
LDLC SERPL CALC-MCNC: 114 MG/DL (ref 0–100)
LYMPHOCYTES # BLD: 1.62 K/UL (ref 0.8–3.5)
LYMPHOCYTES NFR BLD: 28.7 % (ref 12–49)
MCH RBC QN AUTO: 27.7 PG (ref 26–34)
MCHC RBC AUTO-ENTMCNC: 31.4 G/DL (ref 30–36.5)
MCV RBC AUTO: 87.9 FL (ref 80–99)
MONOCYTES # BLD: 0.54 K/UL (ref 0–1)
MONOCYTES NFR BLD: 9.6 % (ref 5–13)
NEUTS SEG # BLD: 3.41 K/UL (ref 1.8–8)
NEUTS SEG NFR BLD: 60.4 % (ref 32–75)
NRBC # BLD: 0 K/UL (ref 0–0.01)
NRBC BLD-RTO: 0 PER 100 WBC
PHOSPHATE SERPL-MCNC: 4.1 MG/DL (ref 2.6–4.7)
PLATELET # BLD AUTO: 207 K/UL (ref 150–400)
PMV BLD AUTO: 11.4 FL (ref 8.9–12.9)
POTASSIUM SERPL-SCNC: 4.2 MMOL/L (ref 3.5–5.1)
PROT SERPL-MCNC: 7.5 G/DL (ref 6.4–8.2)
RBC # BLD AUTO: 4.81 M/UL (ref 3.8–5.2)
SODIUM SERPL-SCNC: 138 MMOL/L (ref 136–145)
TRIGL SERPL-MCNC: 90 MG/DL
TSH SERPL DL<=0.05 MIU/L-ACNC: 4.39 UIU/ML (ref 0.36–3.74)
VIT B12 SERPL-MCNC: 732 PG/ML (ref 193–986)
VLDLC SERPL CALC-MCNC: 18 MG/DL
WBC # BLD AUTO: 5.6 K/UL (ref 3.6–11)

## 2025-01-20 PROCEDURE — 99497 ADVNCD CARE PLAN 30 MIN: CPT | Performed by: FAMILY MEDICINE

## 2025-01-20 PROCEDURE — 99214 OFFICE O/P EST MOD 30 MIN: CPT | Performed by: FAMILY MEDICINE

## 2025-01-20 RX ORDER — DIPHENHYDRAMINE HYDROCHLORIDE 50 MG/ML
50 INJECTION INTRAMUSCULAR; INTRAVENOUS
OUTPATIENT
Start: 2025-01-20

## 2025-01-20 RX ORDER — ACETAMINOPHEN 325 MG/1
650 TABLET ORAL
OUTPATIENT
Start: 2025-01-20

## 2025-01-20 RX ORDER — ALBUTEROL SULFATE 90 UG/1
4 INHALANT RESPIRATORY (INHALATION) PRN
OUTPATIENT
Start: 2025-01-20

## 2025-01-20 RX ORDER — EPINEPHRINE 1 MG/ML
0.3 INJECTION, SOLUTION, CONCENTRATE INTRAVENOUS PRN
OUTPATIENT
Start: 2025-01-20

## 2025-01-20 RX ORDER — SODIUM CHLORIDE 9 MG/ML
INJECTION, SOLUTION INTRAVENOUS CONTINUOUS
OUTPATIENT
Start: 2025-01-20

## 2025-01-20 RX ORDER — FAMOTIDINE 10 MG/ML
20 INJECTION, SOLUTION INTRAVENOUS
OUTPATIENT
Start: 2025-01-20

## 2025-01-20 RX ORDER — ONDANSETRON 2 MG/ML
8 INJECTION INTRAMUSCULAR; INTRAVENOUS
OUTPATIENT
Start: 2025-01-20

## 2025-01-20 RX ORDER — HYDROCORTISONE SODIUM SUCCINATE 100 MG/2ML
100 INJECTION INTRAMUSCULAR; INTRAVENOUS
OUTPATIENT
Start: 2025-01-20

## 2025-01-20 SDOH — ECONOMIC STABILITY: FOOD INSECURITY: WITHIN THE PAST 12 MONTHS, YOU WORRIED THAT YOUR FOOD WOULD RUN OUT BEFORE YOU GOT MONEY TO BUY MORE.: NEVER TRUE

## 2025-01-20 SDOH — ECONOMIC STABILITY: FOOD INSECURITY: WITHIN THE PAST 12 MONTHS, THE FOOD YOU BOUGHT JUST DIDN'T LAST AND YOU DIDN'T HAVE MONEY TO GET MORE.: NEVER TRUE

## 2025-01-20 ASSESSMENT — LIFESTYLE VARIABLES
HOW MANY STANDARD DRINKS CONTAINING ALCOHOL DO YOU HAVE ON A TYPICAL DAY: PATIENT DOES NOT DRINK
HOW OFTEN DO YOU HAVE A DRINK CONTAINING ALCOHOL: NEVER

## 2025-01-20 ASSESSMENT — PATIENT HEALTH QUESTIONNAIRE - PHQ9
SUM OF ALL RESPONSES TO PHQ9 QUESTIONS 1 & 2: 0
SUM OF ALL RESPONSES TO PHQ QUESTIONS 1-9: 0
1. LITTLE INTEREST OR PLEASURE IN DOING THINGS: NOT AT ALL
SUM OF ALL RESPONSES TO PHQ QUESTIONS 1-9: 0
2. FEELING DOWN, DEPRESSED OR HOPELESS: NOT AT ALL

## 2025-01-20 ASSESSMENT — ENCOUNTER SYMPTOMS
CONSTIPATION: 0
SHORTNESS OF BREATH: 0
ABDOMINAL PAIN: 0
COUGH: 0
BACK PAIN: 0
CHEST TIGHTNESS: 0
SORE THROAT: 0
DIARRHEA: 0

## 2025-01-20 NOTE — PROGRESS NOTES
Advance Care Planning     Advance Care Planning (ACP) Physician/NP/PA Conversation    Date of Conversation: 1/20/2025  Conducted with: Patient with Decision Making Capacity    Healthcare Decision Maker:      Primary Decision Maker: Teddy Ortega - Child - [phone]    Secondary Decision Maker: Yasmany ortega - Daughter-in-Law - [phone]    Click here to complete Healthcare Decision Makers including selection of the Healthcare Decision Maker Relationship (ie \"Primary\")  Today we documented Decision Maker(s) consistent with Legal Next of Kin hierarchy.    Care Preferences:    Hospitalization:  \"If your health worsens and it becomes clear that your chance of recovery is unlikely, what would be your preference regarding hospitalization?\"  The patient would prefer hospitalization.    Ventilation:  \"If you were unable to breath on your own and your chance of recovery was unlikely, what would be your preference about the use of a ventilator (breathing machine) if it was available to you?\"  The patient would NOT desire the use of a ventilator.    Resuscitation:  \"In the event your heart stopped as a result of an underlying serious health condition, would you want attempts made to restart your heart, or would you prefer a natural death?\"  Yes, attempt to resuscitate.    treatment goals, benefit/burden of treatment options, artificial nutrition, ventilation preferences, hospitalization preferences, resuscitation preferences, end of life care preferences (vegetative state/imminent death), and hospice care    Conversation Outcomes / Follow-Up Plan:  ACP in process - information provided, considering goals and options  Advanced directive was given today to get completed  Reviewed DNR/DNI and patient elects Full Code (Attempt Resuscitation)    Length of Voluntary ACP Conversation in minutes:  16 minutes    Yumiko Mejia MD                       
Medicare Annual Wellness Visit    Matthew Ortega is here for Medicare AWV    Assessment & Plan   Medicare annual wellness visit, subsequent  ACP (advance care planning)  -     [place] Advanced Care Planning (16-30 minutes) [86326]  -     Full code  Age-related osteoporosis without current pathological fracture  -     Phosphorus; Future  -     Vitamin D 25 Hydroxy; Future  -     Comprehensive Metabolic Panel; Future  Vitamin B12 deficiency  -     Vitamin B12; Future  Dyslipidemia, goal LDL below 130  -     TSH; Future  -     Comprehensive Metabolic Panel; Future  -     Lipid Panel; Future  Osteoarthritis of spine with radiculopathy, cervical region  -     CBC with Auto Differential; Future  Impaired fasting blood sugar  -     Comprehensive Metabolic Panel; Future  -     Hemoglobin A1C; Future  Asymptomatic menopausal state  Need for hepatitis C screening test  -     Hepatitis C Antibody; Future       Return in 6 months (on 7/20/2025) for follow up, fasting.     Subjective   The following acute and/or chronic problems were also addressed today:    Patient's complete Health Risk Assessment and screening values have been reviewed and are found in Flowsheets. The following problems were reviewed today and where indicated follow up appointments were made and/or referrals ordered.    No Positive Risk Factors identified today.    Recent nuclear stress test, echocardiogram workup with cardiology very reassuring.                                Objective   Vitals:    01/20/25 0836   BP: 116/75   Site: Left Upper Arm   Position: Sitting   Cuff Size: Small Adult   Pulse: 81   Resp: 16   Temp: 97.5 °F (36.4 °C)   TempSrc: Temporal   SpO2: 99%   Weight: 58.5 kg (129 lb)   Height: 1.549 m (5' 1\")      Body mass index is 24.37 kg/m².                    Allergies   Allergen Reactions    Fosamax [Alendronate] Myalgia    Codeine Other (See Comments)     Dizziness, rash    Sulfa Antibiotics Dizziness or Vertigo     Prior to Visit Medications  
  Cardiovascular:      Rate and Rhythm: Normal rate and regular rhythm.      Pulses: Normal pulses.      Heart sounds: Normal heart sounds.   Pulmonary:      Effort: Pulmonary effort is normal.      Breath sounds: Normal breath sounds.   Abdominal:      Palpations: Abdomen is soft.      Tenderness: There is no abdominal tenderness. There is no guarding.   Musculoskeletal:      Cervical back: Normal range of motion.   Skin:     General: Skin is warm and dry.   Neurological:      General: No focal deficit present.      Mental Status: She is alert and oriented to person, place, and time.   Psychiatric:         Mood and Affect: Mood normal.         Behavior: Behavior normal.         Labs/Imaging/Diagnostics   Labs:  Lab Results   Component Value Date    WBC 6.1 09/06/2024    HGB 12.8 09/06/2024    HCT 40.4 09/06/2024    MCV 87.8 09/06/2024     09/06/2024     Lab Results   Component Value Date     12/09/2022    K 3.8 12/09/2022     (H) 12/09/2022    CO2 27 12/09/2022    BUN 16 12/09/2022    CREATININE 0.74 06/05/2024    GLUCOSE 90 12/09/2022    CALCIUM 8.8 12/09/2022    BILITOT 0.4 12/09/2022    ALKPHOS 49 12/09/2022    AST 11 (L) 12/09/2022    ALT 13 12/09/2022    LABGLOM >60 12/09/2022    GFRAA >60 06/03/2022    AGRATIO 0.9 (L) 12/09/2022    GLOB 3.7 12/09/2022     No results found for: \"LABA1C\", \"[id number]\"       Assessment & Plan      1. Medicare annual wellness visit, subsequent  2. ACP (advance care planning)  -     [place] Advanced Care Planning (16-30 minutes) [24951]  -     Full code  3. Age-related osteoporosis without current pathological fracture  -     Phosphorus; Future  -     Vitamin D 25 Hydroxy; Future  -     Comprehensive Metabolic Panel; Future  4. Vitamin B12 deficiency  -     Vitamin B12; Future  5. Dyslipidemia, goal LDL below 130  -     TSH; Future  -     Comprehensive Metabolic Panel; Future  -     Lipid Panel; Future  6. Osteoarthritis of spine with radiculopathy, cervical region  -

## 2025-01-21 NOTE — RESULT ENCOUNTER NOTE
Can you please add T4 on her current blood sample ?    Please let patient know that results for kidney and liver function, A1c for diabetes screening, lipid profile, vitamin D and B12 levels, screening for hepatitis C, calcium and phosphorus level, all are very stable and in acceptable range.  Her thyroid function shows elevated TSH, we will make sure that we will check reflex T4 to rule out hypothyroid.  Thanks

## 2025-01-23 DIAGNOSIS — E03.8 SUBCLINICAL HYPOTHYROIDISM: Primary | ICD-10-CM

## 2025-01-23 DIAGNOSIS — E03.8 SUBCLINICAL HYPOTHYROIDISM: ICD-10-CM

## 2025-01-23 LAB — T4 FREE SERPL-MCNC: 1.2 NG/DL (ref 0.8–1.5)

## 2025-02-10 ENCOUNTER — HOSPITAL ENCOUNTER (OUTPATIENT)
Facility: HOSPITAL | Age: 80
Setting detail: INFUSION SERIES
Discharge: HOME | End: 2025-02-10
Payer: MEDICARE

## 2025-02-10 VITALS
OXYGEN SATURATION: 98 % | SYSTOLIC BLOOD PRESSURE: 144 MMHG | DIASTOLIC BLOOD PRESSURE: 78 MMHG | TEMPERATURE: 97.7 F | WEIGHT: 129 LBS | HEART RATE: 82 BPM | RESPIRATION RATE: 16 BRPM | BODY MASS INDEX: 24.37 KG/M2

## 2025-02-10 DIAGNOSIS — M81.0 AGE-RELATED OSTEOPOROSIS WITHOUT CURRENT PATHOLOGICAL FRACTURE: Primary | ICD-10-CM

## 2025-02-10 PROCEDURE — 96372 THER/PROPH/DIAG INJ SC/IM: CPT

## 2025-02-10 PROCEDURE — 6360000002 HC RX W HCPCS: Performed by: FAMILY MEDICINE

## 2025-02-10 RX ORDER — SODIUM CHLORIDE 9 MG/ML
INJECTION, SOLUTION INTRAVENOUS CONTINUOUS
OUTPATIENT
Start: 2025-08-10

## 2025-02-10 RX ORDER — DIPHENHYDRAMINE HYDROCHLORIDE 50 MG/ML
50 INJECTION INTRAMUSCULAR; INTRAVENOUS
OUTPATIENT
Start: 2025-08-10

## 2025-02-10 RX ORDER — ONDANSETRON 2 MG/ML
8 INJECTION INTRAMUSCULAR; INTRAVENOUS
OUTPATIENT
Start: 2025-08-10

## 2025-02-10 RX ORDER — ALBUTEROL SULFATE 90 UG/1
4 INHALANT RESPIRATORY (INHALATION) PRN
OUTPATIENT
Start: 2025-08-10

## 2025-02-10 RX ORDER — ACETAMINOPHEN 325 MG/1
650 TABLET ORAL
OUTPATIENT
Start: 2025-08-10

## 2025-02-10 RX ORDER — EPINEPHRINE 1 MG/ML
0.3 INJECTION, SOLUTION INTRAMUSCULAR; SUBCUTANEOUS PRN
OUTPATIENT
Start: 2025-08-10

## 2025-02-10 RX ORDER — HYDROCORTISONE SODIUM SUCCINATE 100 MG/2ML
100 INJECTION INTRAMUSCULAR; INTRAVENOUS
OUTPATIENT
Start: 2025-08-10

## 2025-02-10 RX ADMIN — DENOSUMAB 60 MG: 60 INJECTION SUBCUTANEOUS at 08:46

## 2025-02-10 NOTE — PROGRESS NOTES
[facility] Progress Note    Date: February 10, 2025        0830: Pt arrived ambulatory to [facility] for Prolia in stable condition.  Assessment completed.      Labs reviewed. Criteria for treatment was met.    Vitals:    02/10/25 0830   BP: (!) 144/78   Pulse: 82   Resp: 16   Temp: 97.7 °F (36.5 °C)   SpO2: 98%        Medications Administered         denosumab (PROLIA) SC injection 60 mg Admin Date  02/10/2025 Action  Given Dose  60 mg Route  SubCUTAneous Documented By  Adelaide Kapadia, RN            Given left arm     Ms. Ortega tolerated the treatment well, and had no complaints.    [facility]. Patient is aware of next scheduled [facility] appointment.    Future Appointments   Date Time Provider Department Center   2/10/2025  9:00 AM MAT[facility]W FASTTRACK 3 [facility]   6/24/2025 10:40 AM Suellen Mccartney MD CAV BS AMB           Adelaide Kapadia, TRACY, RN  February 10, 2025

## 2025-02-24 ENCOUNTER — TELEPHONE (OUTPATIENT)
Age: 80
End: 2025-02-24

## 2025-02-24 DIAGNOSIS — E78.00 HYPERCHOLESTEREMIA: ICD-10-CM

## 2025-02-24 RX ORDER — ATORVASTATIN CALCIUM 10 MG/1
10 TABLET, FILM COATED ORAL DAILY
Qty: 90 TABLET | Refills: 3 | Status: SHIPPED | OUTPATIENT
Start: 2025-02-24

## 2025-02-24 NOTE — TELEPHONE ENCOUNTER
Per VO by MD.    Future Appointments   Date Time Provider Department Center   6/24/2025 10:40 AM Suellen Mccartney MD CAV BS AMB   7/8/2025 10:20 AM [facility]   8/11/2025 10:30 AM MATHEW HOYT 3 [facility]

## 2025-04-22 ENCOUNTER — OFFICE VISIT (OUTPATIENT)
Age: 80
End: 2025-04-22
Payer: MEDICARE

## 2025-04-22 VITALS
TEMPERATURE: 97 F | BODY MASS INDEX: 24.35 KG/M2 | WEIGHT: 129 LBS | DIASTOLIC BLOOD PRESSURE: 74 MMHG | OXYGEN SATURATION: 97 % | HEART RATE: 72 BPM | HEIGHT: 61 IN | RESPIRATION RATE: 16 BRPM | SYSTOLIC BLOOD PRESSURE: 111 MMHG

## 2025-04-22 DIAGNOSIS — G60.9 IDIOPATHIC PERIPHERAL NEUROPATHY: Primary | ICD-10-CM

## 2025-04-22 DIAGNOSIS — I73.00 RAYNAUD'S PHENOMENON WITHOUT GANGRENE: ICD-10-CM

## 2025-04-22 PROCEDURE — 99213 OFFICE O/P EST LOW 20 MIN: CPT | Performed by: FAMILY MEDICINE

## 2025-04-22 PROCEDURE — 1159F MED LIST DOCD IN RCRD: CPT | Performed by: FAMILY MEDICINE

## 2025-04-22 PROCEDURE — 1160F RVW MEDS BY RX/DR IN RCRD: CPT | Performed by: FAMILY MEDICINE

## 2025-04-22 PROCEDURE — 1123F ACP DISCUSS/DSCN MKR DOCD: CPT | Performed by: FAMILY MEDICINE

## 2025-04-22 RX ORDER — PREGABALIN 25 MG/1
25 CAPSULE ORAL 2 TIMES DAILY
Qty: 60 CAPSULE | Refills: 0 | Status: SHIPPED | OUTPATIENT
Start: 2025-04-22 | End: 2025-04-23 | Stop reason: SDUPTHER

## 2025-04-22 ASSESSMENT — ENCOUNTER SYMPTOMS
CONSTIPATION: 0
SORE THROAT: 0
BACK PAIN: 0
DIARRHEA: 0
CHEST TIGHTNESS: 0
ABDOMINAL PAIN: 0
COUGH: 0
SHORTNESS OF BREATH: 0

## 2025-04-22 NOTE — PROGRESS NOTES
Chief Complaint   Patient presents with    Pain     Feet pain. [id number].          \"Have you been to the ER, urgent care clinic since your last visit?  Hospitalized since your last visit?\"    NO    “Have you seen or consulted any other health care providers outside of [facility] since your last visit?”    NO            Click Here for Release of Records Request           1/20/2025     8:35 AM   PHQ-9    Little interest or pleasure in doing things 0   Feeling down, depressed, or hopeless 0   PHQ-2 Score 0   PHQ-9 Total Score 0           Financial Resource Strain: Low Risk  (9/6/2024)    Overall Financial Resource Strain (CARDIA)     Difficulty of Paying Living Expenses: Not hard at all      Food Insecurity: No Food Insecurity (1/20/2025)    Hunger Vital Sign     Worried About Running Out of Food in the Last Year: Never true     Ran Out of Food in the Last Year: Never true          Health Maintenance Due   Topic Date Due    DTaP/Tdap/Td vaccine (1 - Tdap) Never done    Shingles vaccine (1 of 2) Never done    COVID-19 Vaccine (3 - 2024-25 season) 09/01/2024

## 2025-04-22 NOTE — PROGRESS NOTES
Date:4/22/2025        Patient Name:Matthew Ortega     YOB: 1945     Age:79 y.o.    Seen today for   Chief Complaint   Patient presents with    Pain     Feet pain. Y1sthgo.      The patient (or guardian, if applicable) and other individuals in attendance with the patient were advised that Artificial Intelligence will be utilized during this visit to record, process the conversation to generate a clinical note, and support improvement of the AI technology. The patient (or guardian, if applicable) and other individuals in attendance at the appointment consented to the use of AI, including the recording.      HPI     History of Present Illness  The patient presents for evaluation of redness and swelling in her both feet, mainly bilateral toes.    The chief complaint is intermittent episodes of foot redness and mild swelling for the past 2 weeks. The redness is observed when wearing socks and shoes but subsides upon their removal. No associated pain is reported, and the swelling is not sensitive to touch. There is no morning stiffness, difficulty in ambulation, or stinging sensation. Occasionally, a burning sensation is experienced, which is alleviated by the application of lotion.    Previous evaluations in 09/2024 included tests for rheumatoid and rheumatological conditions, which returned normal results. A slightly elevated CRP level was noted, indicating mild age-related inflammation. The patient denies any history of diabetes, and previous tests for diabetes were normal.  Her vitamin B12 level was also very normal in January.    Current medications include vitamin B12, vitamin D, atorvastatin, baclofen as needed for neck pain, meclizine as needed, Prolia injections every 6 months, and meloxicam as needed for arthritis. Meclizine has not been taken for an extended period.        Review of Systems   Review of Systems   Constitutional:  Negative for fever.   HENT:  Negative for congestion, ear pain and

## 2025-04-23 ENCOUNTER — TELEPHONE (OUTPATIENT)
Age: 80
End: 2025-04-23

## 2025-04-23 DIAGNOSIS — G60.9 IDIOPATHIC PERIPHERAL NEUROPATHY: ICD-10-CM

## 2025-04-23 DIAGNOSIS — E78.00 HYPERCHOLESTEREMIA: ICD-10-CM

## 2025-04-23 RX ORDER — PREGABALIN 25 MG/1
25 CAPSULE ORAL 2 TIMES DAILY
Qty: 60 CAPSULE | Refills: 0 | Status: SHIPPED | OUTPATIENT
Start: 2025-04-23 | End: 2025-05-23

## 2025-04-23 RX ORDER — ATORVASTATIN CALCIUM 10 MG/1
10 TABLET, FILM COATED ORAL DAILY
Qty: 90 TABLET | Refills: 3 | Status: CANCELLED | OUTPATIENT
Start: 2025-04-23

## 2025-04-23 NOTE — TELEPHONE ENCOUNTER
PT called stating she needs all her medication sent to [facility] [place] [address] [place] [address] as old pharm doesn't take her insurance. PT stated that pregabalin (LYRICA) 25 MG capsule that was prescribed 4/22 Is very important and needs it ASAP. PT would like a call back @[phone]

## 2025-06-30 ENCOUNTER — TRANSCRIBE ORDERS (OUTPATIENT)
Facility: HOSPITAL | Age: 80
End: 2025-06-30

## 2025-06-30 DIAGNOSIS — Z12.31 VISIT FOR SCREENING MAMMOGRAM: Primary | ICD-10-CM

## 2025-07-08 ENCOUNTER — OFFICE VISIT (OUTPATIENT)
Age: 80
End: 2025-07-08
Payer: MEDICARE

## 2025-07-08 VITALS
DIASTOLIC BLOOD PRESSURE: 77 MMHG | BODY MASS INDEX: 25.07 KG/M2 | OXYGEN SATURATION: 96 % | WEIGHT: 132.8 LBS | RESPIRATION RATE: 16 BRPM | SYSTOLIC BLOOD PRESSURE: 118 MMHG | TEMPERATURE: 97.1 F | HEIGHT: 61 IN | HEART RATE: 72 BPM

## 2025-07-08 DIAGNOSIS — G60.9 IDIOPATHIC PERIPHERAL NEUROPATHY: ICD-10-CM

## 2025-07-08 DIAGNOSIS — I73.00 RAYNAUD'S PHENOMENON WITHOUT GANGRENE: ICD-10-CM

## 2025-07-08 DIAGNOSIS — E78.5 DYSLIPIDEMIA, GOAL LDL BELOW 130: ICD-10-CM

## 2025-07-08 DIAGNOSIS — M81.0 AGE-RELATED OSTEOPOROSIS WITHOUT CURRENT PATHOLOGICAL FRACTURE: ICD-10-CM

## 2025-07-08 DIAGNOSIS — G44.229 CHRONIC TENSION-TYPE HEADACHE, NOT INTRACTABLE: ICD-10-CM

## 2025-07-08 DIAGNOSIS — E78.5 DYSLIPIDEMIA, GOAL LDL BELOW 130: Primary | ICD-10-CM

## 2025-07-08 DIAGNOSIS — M47.22 OSTEOARTHRITIS OF SPINE WITH RADICULOPATHY, CERVICAL REGION: ICD-10-CM

## 2025-07-08 DIAGNOSIS — R73.01 IMPAIRED FASTING BLOOD SUGAR: ICD-10-CM

## 2025-07-08 PROBLEM — H04.122 DRY EYE SYNDROME OF LEFT EYE: Status: ACTIVE | Noted: 2020-06-26

## 2025-07-08 PROBLEM — H25.10 AGE-RELATED NUCLEAR CATARACT: Status: ACTIVE | Noted: 2019-08-30

## 2025-07-08 PROCEDURE — 1160F RVW MEDS BY RX/DR IN RCRD: CPT | Performed by: FAMILY MEDICINE

## 2025-07-08 PROCEDURE — 1159F MED LIST DOCD IN RCRD: CPT | Performed by: FAMILY MEDICINE

## 2025-07-08 PROCEDURE — G2211 COMPLEX E/M VISIT ADD ON: HCPCS | Performed by: FAMILY MEDICINE

## 2025-07-08 PROCEDURE — 99214 OFFICE O/P EST MOD 30 MIN: CPT | Performed by: FAMILY MEDICINE

## 2025-07-08 PROCEDURE — 1123F ACP DISCUSS/DSCN MKR DOCD: CPT | Performed by: FAMILY MEDICINE

## 2025-07-08 PROCEDURE — 1126F AMNT PAIN NOTED NONE PRSNT: CPT | Performed by: FAMILY MEDICINE

## 2025-07-08 RX ORDER — BACLOFEN 10 MG/1
5 TABLET ORAL NIGHTLY PRN
Qty: 30 TABLET | Refills: 0 | Status: SHIPPED | OUTPATIENT
Start: 2025-07-08

## 2025-07-08 RX ORDER — AZITHROMYCIN 250 MG/1
TABLET, FILM COATED ORAL
Qty: 6 TABLET | Refills: 0 | Status: SHIPPED | OUTPATIENT
Start: 2025-07-08 | End: 2025-07-18

## 2025-07-08 RX ORDER — PREGABALIN 25 MG/1
25 CAPSULE ORAL 2 TIMES DAILY
Qty: 60 CAPSULE | Refills: 0 | Status: SHIPPED | OUTPATIENT
Start: 2025-07-08 | End: 2025-08-07

## 2025-07-08 ASSESSMENT — ENCOUNTER SYMPTOMS
DIARRHEA: 0
CHEST TIGHTNESS: 0
SHORTNESS OF BREATH: 0
BACK PAIN: 0
SORE THROAT: 0
ABDOMINAL PAIN: 0
COUGH: 0
CONSTIPATION: 0

## 2025-07-08 ASSESSMENT — PATIENT HEALTH QUESTIONNAIRE - PHQ9
SUM OF ALL RESPONSES TO PHQ QUESTIONS 1-9: 0
2. FEELING DOWN, DEPRESSED OR HOPELESS: NOT AT ALL
SUM OF ALL RESPONSES TO PHQ QUESTIONS 1-9: 0
1. LITTLE INTEREST OR PLEASURE IN DOING THINGS: NOT AT ALL

## 2025-07-08 NOTE — PROGRESS NOTES
Chief Complaint   Patient presents with    Cholesterol Problem     Follow up         \"Have you been to the ER, urgent care clinic since your last visit?  Hospitalized since your last visit?\"    NO    “Have you seen or consulted any other health care providers outside of [facility] since your last visit?”    NO            Click Here for Release of Records Request           7/8/2025     9:50 AM   PHQ-9    Little interest or pleasure in doing things 0   Feeling down, depressed, or hopeless 0   PHQ-2 Score 0   PHQ-9 Total Score 0           Financial Resource Strain: Low Risk  (9/6/2024)    Overall Financial Resource Strain (CARDIA)     Difficulty of Paying Living Expenses: Not hard at all      Food Insecurity: No Food Insecurity (1/20/2025)    Hunger Vital Sign     Worried About Running Out of Food in the Last Year: Never true     Ran Out of Food in the Last Year: Never true          Health Maintenance Due   Topic Date Due    DTaP/Tdap/Td vaccine (1 - Tdap) Never done    Shingles vaccine (1 of 2) Never done    COVID-19 Vaccine (3 - 2024-25 season) 09/01/2024

## 2025-07-08 NOTE — PROGRESS NOTES
Date:7/8/2025        Patient Name:Matthew Ortega     YOB: 1945     Age:79 y.o.  The patient (or guardian, if applicable) and other individuals in attendance with the patient were advised that Artificial Intelligence will be utilized during this visit to record, process the conversation to generate a clinical note, and support improvement of the AI technology. The patient (or guardian, if applicable) and other individuals in attendance at the appointment consented to the use of AI, including the recording.        Seen today for   Chief Complaint   Patient presents with    Cholesterol Problem     Follow up       HPI     History of Present Illness  The patient is a 79-year-old female who is seen today for follow-up on dyslipidemia, neuropathy, and health maintenance.    She has been experiencing persistent burning, redness in both feet which she manages with pregabalin, although she has not been taking it regularly. She reports no fever. She also takes magnesium, vitamin B12, vitamin D, and calcium supplements daily. Additionally, she applies coconut oil to her feet before bedtime, which provides some relief. She is interested in exploring herbal remedies for her condition.    She is due for a bone density test and mammogram in 09/2025. She has an appointment with Dr. Bland for her annual checkup on 09/13/2025. She has gained 3 pounds since her last visit. She is currently on atorvastatin and pregabalin, which she takes at night due to morning dizziness. She also takes meclizine once a year and over-the-counter vitamin D and B12 supplements. She does not take meloxicam regularly for arthritis and does not use fish oil supplements.    She requests a refill of Zithromax , pregabalin and muscle relaxer.    Cardiovascular Review  The patient has hyperlipidemia.  She reports taking medications as instructed, no medication side effects noted, no chest pain on exertion, no dyspnea on exertion, no swelling of

## 2025-07-09 LAB
ALBUMIN SERPL-MCNC: 3.8 G/DL (ref 3.5–5)
ALBUMIN/GLOB SERPL: 1.1 (ref 1.1–2.2)
ALP SERPL-CCNC: 45 U/L (ref 45–117)
ALT SERPL-CCNC: 16 U/L (ref 12–78)
ANION GAP SERPL CALC-SCNC: 2 MMOL/L (ref 2–12)
AST SERPL-CCNC: 16 U/L (ref 15–37)
BILIRUB SERPL-MCNC: 0.5 MG/DL (ref 0.2–1)
BUN SERPL-MCNC: 20 MG/DL (ref 6–20)
BUN/CREAT SERPL: 22 (ref 12–20)
CALCIUM SERPL-MCNC: 9.3 MG/DL (ref 8.5–10.1)
CHLORIDE SERPL-SCNC: 105 MMOL/L (ref 97–108)
CHOLEST SERPL-MCNC: 185 MG/DL
CO2 SERPL-SCNC: 30 MMOL/L (ref 21–32)
CREAT SERPL-MCNC: 0.91 MG/DL (ref 0.55–1.02)
EST. AVERAGE GLUCOSE BLD GHB EST-MCNC: 128 MG/DL
GLOBULIN SER CALC-MCNC: 3.4 G/DL (ref 2–4)
GLUCOSE SERPL-MCNC: 88 MG/DL (ref 65–100)
HBA1C MFR BLD: 6.1 % (ref 4–5.6)
HDLC SERPL-MCNC: 68 MG/DL
HDLC SERPL: 2.7 (ref 0–5)
LDLC SERPL CALC-MCNC: 102.4 MG/DL (ref 0–100)
POTASSIUM SERPL-SCNC: 4.5 MMOL/L (ref 3.5–5.1)
PROT SERPL-MCNC: 7.2 G/DL (ref 6.4–8.2)
SODIUM SERPL-SCNC: 137 MMOL/L (ref 136–145)
TRIGL SERPL-MCNC: 73 MG/DL
TSH SERPL DL<=0.05 MIU/L-ACNC: 3.53 UIU/ML (ref 0.36–3.74)
VLDLC SERPL CALC-MCNC: 14.6 MG/DL

## 2025-07-10 LAB
HISPANIC?: NORMAL
LEAD BLD-MCNC: 1.4 UG/DL (ref 0–3.4)
RACE: NORMAL
SPECIMEN SOURCE: NORMAL
TEST PURPOSE: NORMAL

## 2025-07-11 ENCOUNTER — TELEPHONE (OUTPATIENT)
Age: 80
End: 2025-07-11

## 2025-07-11 NOTE — TELEPHONE ENCOUNTER
Call and spoke to patient, two ID confirmed.  Spoke to  and she stated that they do not do Nerve Conduction test there. Patient was also informed same information.     Need to know where to go to get test done.

## 2025-07-11 NOTE — TELEPHONE ENCOUNTER
Pt called states that  was not able to do the test. Pt is asking for a call back from the nurse.        BCBN [phone]    Or     BCBN [phone]

## 2025-07-12 LAB
ARSENIC BLD-MCNC: 2 UG/L (ref 0–9)
HISPANIC?: NO
LEAD BLD-MCNC: 1.4 UG/DL (ref 0–3.4)
MERCURY BLD-MCNC: <1 UG/L (ref 0–14.9)
RACE: NORMAL
SPECIMEN SOURCE: NORMAL
TEST PURPOSE: NORMAL

## 2025-07-14 ENCOUNTER — TELEPHONE (OUTPATIENT)
Age: 80
End: 2025-07-14

## 2025-07-14 NOTE — TELEPHONE ENCOUNTER
Outbound call to Pt . Name and  verified.     Provided inf per providers message. Pt stated understanding.

## 2025-07-14 NOTE — TELEPHONE ENCOUNTER
Pt called states that she would like to speak with nurse or  about a referral for neurology.        [facility] CELL # [phone]

## 2025-07-14 NOTE — TELEPHONE ENCOUNTER
Outbound call to Pt . Name and  verified.     Provided Referral inf.   Referred To: [facility] [place] [id number]    Elissa Brent S  [address] [place] [id number] Loc/POS:                Phone: [phone] Phone:     Fax: [phone] Fax:        Patient stayed she will try to call them. She believes she has spoke to them and they said they don't have availability until Dec. Adv I will ask Dr Mejia for another recommendation if she sees appropriated.

## 2025-07-14 NOTE — TELEPHONE ENCOUNTER
VANESSAM offering a NP appointment appointment that has opened up for today, requested a call back to confirm arrival time of 10:45 am. Provided phone number and address on voice mail as well. GILBERT

## 2025-07-14 NOTE — TELEPHONE ENCOUNTER
I have already reached out to Dr. Bowers, neurology at [facility] and he will contact patient and see her sooner.  Neurology try to reach out to her to accommodate her for today's appointment.  Just let her know that she needs to call back on the number that was given to her by neurology.  Thanks

## 2025-07-15 NOTE — TELEPHONE ENCOUNTER
Reached back out to the patient to schedule a EMG study based off the referral. LVM to call and schedule.     If the patient calls back please reach out to the front to schedule.

## 2025-08-04 ENCOUNTER — HOSPITAL ENCOUNTER (OUTPATIENT)
Facility: HOSPITAL | Age: 80
Discharge: HOME | End: 2025-08-07
Attending: FAMILY MEDICINE
Payer: MEDICARE

## 2025-08-04 ENCOUNTER — HOSPITAL ENCOUNTER (OUTPATIENT)
Facility: HOSPITAL | Age: 80
Discharge: HOME | End: 2025-08-06
Attending: FAMILY MEDICINE
Payer: MEDICARE

## 2025-08-04 VITALS — BODY MASS INDEX: 25.06 KG/M2 | HEIGHT: 61 IN | WEIGHT: 132.72 LBS

## 2025-08-04 DIAGNOSIS — Z12.31 VISIT FOR SCREENING MAMMOGRAM: ICD-10-CM

## 2025-08-04 DIAGNOSIS — M81.0 AGE-RELATED OSTEOPOROSIS WITHOUT CURRENT PATHOLOGICAL FRACTURE: ICD-10-CM

## 2025-08-04 DIAGNOSIS — I73.00 RAYNAUD'S PHENOMENON WITHOUT GANGRENE: ICD-10-CM

## 2025-08-04 PROCEDURE — 77080 DXA BONE DENSITY AXIAL: CPT

## 2025-08-04 PROCEDURE — 77067 SCR MAMMO BI INCL CAD: CPT

## 2025-08-04 PROCEDURE — 93925 LOWER EXTREMITY STUDY: CPT

## 2025-08-06 ENCOUNTER — OFFICE VISIT (OUTPATIENT)
Age: 80
End: 2025-08-06
Payer: MEDICARE

## 2025-08-06 VITALS
SYSTOLIC BLOOD PRESSURE: 128 MMHG | OXYGEN SATURATION: 98 % | BODY MASS INDEX: 25.3 KG/M2 | HEIGHT: 61 IN | DIASTOLIC BLOOD PRESSURE: 82 MMHG | HEART RATE: 80 BPM | WEIGHT: 134 LBS

## 2025-08-06 DIAGNOSIS — G60.9 IDIOPATHIC PERIPHERAL NEUROPATHY: ICD-10-CM

## 2025-08-06 DIAGNOSIS — G25.81 RESTLESS LEG SYNDROME: Primary | ICD-10-CM

## 2025-08-06 PROCEDURE — 99215 OFFICE O/P EST HI 40 MIN: CPT | Performed by: PSYCHIATRY & NEUROLOGY

## 2025-08-06 PROCEDURE — 1159F MED LIST DOCD IN RCRD: CPT | Performed by: PSYCHIATRY & NEUROLOGY

## 2025-08-06 PROCEDURE — 1123F ACP DISCUSS/DSCN MKR DOCD: CPT | Performed by: PSYCHIATRY & NEUROLOGY

## 2025-08-06 RX ORDER — PREGABALIN 25 MG/1
25 CAPSULE ORAL EVERY EVENING
Qty: 90 CAPSULE | Refills: 0
Start: 2025-08-06 | End: 2025-09-05

## 2025-08-06 RX ORDER — SIMVASTATIN 10 MG
TABLET ORAL
COMMUNITY

## 2025-08-06 RX ORDER — RISEDRONATE SODIUM 35 MG/1
TABLET, FILM COATED ORAL
COMMUNITY

## 2025-08-06 ASSESSMENT — PATIENT HEALTH QUESTIONNAIRE - PHQ9
SUM OF ALL RESPONSES TO PHQ QUESTIONS 1-9: 0
SUM OF ALL RESPONSES TO PHQ QUESTIONS 1-9: 0
1. LITTLE INTEREST OR PLEASURE IN DOING THINGS: NOT AT ALL
2. FEELING DOWN, DEPRESSED OR HOPELESS: NOT AT ALL
SUM OF ALL RESPONSES TO PHQ QUESTIONS 1-9: 0
SUM OF ALL RESPONSES TO PHQ QUESTIONS 1-9: 0

## 2025-08-09 LAB
ECHO BSA: 1.61 M2
VAS LEFT ATA DIST PSV: 62.6 CM/S
VAS LEFT ATA PROX PSV: 70.1 CM/S
VAS LEFT CFA PROX PSV: 122.8 CM/S
VAS LEFT PERONEAL DIST PSV: 57.9 CM/S
VAS LEFT PFA PROX PSV: 47.9 CM/S
VAS LEFT POP A DIST PSV: 69.2 CM/S
VAS LEFT POP A PROX PSV: 62.6 CM/S
VAS LEFT POP A PROX VEL RATIO: 0.79
VAS LEFT PTA DIST PSV: 62.6 CM/S
VAS LEFT PTA PROX PSV: 55 CM/S
VAS LEFT SFA DIST PSV: 78.9 CM/S
VAS LEFT SFA DIST VEL RATIO: 0.84
VAS LEFT SFA MID PSV: 93.7 CM/S
VAS LEFT SFA MID VEL RATIO: 0.63
VAS LEFT SFA PROX PSV: 149.8 CM/S
VAS LEFT SFA PROX VEL RATIO: 1.22
VAS RIGHT ATA DIST PSV: 58.7 CM/S
VAS RIGHT ATA PROX PSV: 66.5 CM/S
VAS RIGHT CFA PROX PSV: 135.6 CM/S
VAS RIGHT PERONEAL DIST PSV: 50.7 CM/S
VAS RIGHT PFA PROX PSV: 59.7 CM/S
VAS RIGHT POP A DIST PSV: 66.6 CM/S
VAS RIGHT POP A PROX PSV: 71.5 CM/S
VAS RIGHT POP A PROX VEL RATIO: 0.98
VAS RIGHT PTA DIST PSV: 30.9 CM/S
VAS RIGHT PTA PROX PSV: 45.7 CM/S
VAS RIGHT SFA DIST PSV: 72.8 CM/S
VAS RIGHT SFA DIST VEL RATIO: 0.83
VAS RIGHT SFA MID PSV: 87.5 CM/S
VAS RIGHT SFA MID VEL RATIO: 0.9
VAS RIGHT SFA PROX PSV: 99.8 CM/S
VAS RIGHT SFA PROX VEL RATIO: 0.7